# Patient Record
Sex: MALE | Race: WHITE | NOT HISPANIC OR LATINO | Employment: OTHER | URBAN - METROPOLITAN AREA
[De-identification: names, ages, dates, MRNs, and addresses within clinical notes are randomized per-mention and may not be internally consistent; named-entity substitution may affect disease eponyms.]

---

## 2021-03-25 ENCOUNTER — OFFICE VISIT (OUTPATIENT)
Dept: FAMILY MEDICINE CLINIC | Facility: CLINIC | Age: 56
End: 2021-03-25
Payer: COMMERCIAL

## 2021-03-25 VITALS
SYSTOLIC BLOOD PRESSURE: 138 MMHG | BODY MASS INDEX: 27.74 KG/M2 | HEART RATE: 68 BPM | WEIGHT: 183 LBS | HEIGHT: 68 IN | TEMPERATURE: 98.4 F | DIASTOLIC BLOOD PRESSURE: 78 MMHG | OXYGEN SATURATION: 98 %

## 2021-03-25 DIAGNOSIS — Z12.5 SCREENING FOR PROSTATE CANCER: ICD-10-CM

## 2021-03-25 DIAGNOSIS — Z76.89 ENCOUNTER TO ESTABLISH CARE: ICD-10-CM

## 2021-03-25 DIAGNOSIS — Z13.29 SCREENING FOR THYROID DISORDER: ICD-10-CM

## 2021-03-25 DIAGNOSIS — Z13.220 SCREENING FOR LIPID DISORDERS: ICD-10-CM

## 2021-03-25 DIAGNOSIS — Z13.1 SCREENING FOR DIABETES MELLITUS: ICD-10-CM

## 2021-03-25 DIAGNOSIS — Z12.11 SCREENING FOR COLON CANCER: ICD-10-CM

## 2021-03-25 DIAGNOSIS — Z13.6 SCREENING FOR CARDIOVASCULAR CONDITION: ICD-10-CM

## 2021-03-25 DIAGNOSIS — R94.31 ABNORMAL EKG: ICD-10-CM

## 2021-03-25 DIAGNOSIS — Z72.0 TOBACCO USE: ICD-10-CM

## 2021-03-25 DIAGNOSIS — R07.9 CHEST PAIN, UNSPECIFIED TYPE: Primary | ICD-10-CM

## 2021-03-25 DIAGNOSIS — M65.30 TRIGGER FINGER OF RIGHT HAND, UNSPECIFIED FINGER: ICD-10-CM

## 2021-03-25 PROCEDURE — 99203 OFFICE O/P NEW LOW 30 MIN: CPT | Performed by: NURSE PRACTITIONER

## 2021-03-25 RX ORDER — ASPIRIN 81 MG/1
81 TABLET ORAL DAILY
COMMUNITY

## 2021-03-28 PROCEDURE — 93000 ELECTROCARDIOGRAM COMPLETE: CPT | Performed by: NURSE PRACTITIONER

## 2021-03-28 NOTE — PROGRESS NOTES
[facility]    ASSESSMENT AND PLAN     1  Chest pain, unspecified type    Reviewed chest pain  Intermittent  Cannot really qualify or quantify  Does smoke, and have excessive caffeine use  Seems extremely high-strung in the office tonight  States he has always been high energy  Wife present and agrees  Finds it hard to relax  In office EKG completed tonight  Assess further with an echo stress  Likely refer to Cardiology  Strict ER precautions    - POCT ECG  - Echo stress test w contrast if indicated; Future    2  Screening for cardiovascular condition   overdue for screening /preventative lab work  None for several years  Fasting orders placed today  Patient to obtain within the next 1-2 weeks, and schedule a follow-up/physical   Review at that time    - CBC and differential; Future    3  Screening for prostate cancer    - PSA, Total Screen; Future    4  Screening for lipid disorders    - Lipid panel; Future    5  Screening for thyroid disorder    - TSH, 3rd generation with Free T4 reflex; Future    6  Screening for diabetes mellitus    - Comprehensive metabolic panel; Future  - HEMOGLOBIN A1C W/ EAG ESTIMATION; Future    7  Tobacco use   smokes greater than 1 ppd for greater than 40 years  Recommend lung CT screening program   Order placed  Discussed quitting  Patient is not ready at this time    - CT lung screening program; Future    8  Screening for colon cancer    - Ambulatory referral for colonoscopy; Future    9  Encounter to establish care   establishing primary care in our office  10  Abnormal EKG    - Echo stress test w contrast if indicated; Future    11  Trigger finger right hand, unspecified finger  Refer to hand specialist    - Ambulatory referral to hand surgery    SUBJECTIVE       Patient ID: Jackie Thompson is a 54 y o  male      Chief Complaint   Patient presents with    Establish Care    Hypertension       HISTORY OF PRESENT ILLNESS     Patient presents tonight to establish primary care in our office  Was recently at the dentist, and states his blood pressure was 1  systolic  He does not recall the bottom number  He does not check his blood pressure at home  Last time he checked his blood pressure he believes it was 140-150 over 70s  Does admit to occasional / random periods of chest pain  States they are central located  And feel heavy  Resolve within a few minutes  Occur at rest or with exertion  States pain does not radiate  Denies any sweating  Does admit to excessive burping at times  Cannot identify any food or activity triggers tonight  Does admit to drinking excessive amounts of coffee  States he was drinking up to 3 pots per day  He has cut back recently to 1 pot  Does admit to smoking  Averages 1 ppd  Rolls his own cigarettes  Complains also of finger cramping  Right pointer, middle and ring fingers seize and he cannot bend them  He must soak thumb in warm water and manipulate them to release them  The following portions of the patient's history were reviewed and updated as appropriate: allergies, current medications, past family history, past medical history, past social history, past surgical history and problem list     REVIEW OF SYSTEMS  Review of Systems   Constitutional: Negative  HENT: Negative  Respiratory: Negative  Cardiovascular: Positive for chest pain  Negative for palpitations and leg swelling  Gastrointestinal: Negative  Genitourinary: Negative  Musculoskeletal: Positive for arthralgias  Neurological: Negative  Negative for dizziness  Psychiatric/Behavioral: Negative          OBJECTIVE      VITAL SIGNS  /78 (BP Location: Right arm, Patient Position: Sitting)   Pulse 68   Temp 98 4 °F (36 9 °C) (Tympanic)   Ht 5' 7 75" (1 721 m)   Wt 83 kg (183 lb)   SpO2 98%   BMI 28 03 kg/m²     CURRENT MEDICATIONS    Current Outpatient Medications:     aspirin (ECOTRIN LOW STRENGTH) 81 mg EC tablet, Take 81 mg by mouth daily, Disp: , Rfl:     co-enzyme Q-10 30 MG capsule, Take 30 mg by mouth 3 (three) times a day, Disp: , Rfl:     cyanocobalamin (VITAMIN B-12) 500 MCG tablet, Take 500 mcg by mouth daily, Disp: , Rfl:       PHYSICAL EXAMINATION   Physical Exam  Vitals signs and nursing note reviewed  Constitutional:       General: He is not in acute distress  Appearance: Normal appearance  He is well-developed  He is not ill-appearing  HENT:      Head: Normocephalic and atraumatic  Right Ear: Tympanic membrane, ear canal and external ear normal       Left Ear: Tympanic membrane, ear canal and external ear normal    Eyes:      Pupils: Pupils are equal, round, and reactive to light  Neck:      Vascular: No carotid bruit  Cardiovascular:      Rate and Rhythm: Normal rate and regular rhythm  No extrasystoles are present  Heart sounds: Normal heart sounds  Musculoskeletal:      Right lower leg: No edema  Left lower leg: No edema  Lymphadenopathy:      Cervical: No cervical adenopathy  Skin:     General: Skin is warm and dry  Neurological:      Mental Status: He is alert and oriented to person, place, and time     Psychiatric:         Attention and Perception: Attention normal          Mood and Affect: Mood normal          Speech: Speech normal          Behavior: Behavior normal

## 2021-04-07 ENCOUNTER — IMMUNIZATIONS (OUTPATIENT)
Dept: FAMILY MEDICINE CLINIC | Facility: HOSPITAL | Age: 56
End: 2021-04-07
Payer: COMMERCIAL

## 2021-04-07 ENCOUNTER — TRANSCRIBE ORDERS (OUTPATIENT)
Dept: ADMINISTRATIVE | Facility: HOSPITAL | Age: 56
End: 2021-04-07

## 2021-04-07 ENCOUNTER — APPOINTMENT (OUTPATIENT)
Dept: LAB | Facility: HOSPITAL | Age: 56
End: 2021-04-07
Payer: COMMERCIAL

## 2021-04-07 DIAGNOSIS — Z00.8 ENCOUNTER FOR OTHER GENERAL EXAMINATION: ICD-10-CM

## 2021-04-07 DIAGNOSIS — Z00.8 ENCOUNTER FOR OTHER GENERAL EXAMINATION: Primary | ICD-10-CM

## 2021-04-07 DIAGNOSIS — Z23 ENCOUNTER FOR IMMUNIZATION: Primary | ICD-10-CM

## 2021-04-07 LAB
ALBUMIN SERPL BCP-MCNC: 3.8 G/DL (ref 3.5–5)
ALP SERPL-CCNC: 79 U/L (ref 46–116)
ALT SERPL W P-5'-P-CCNC: 32 U/L (ref 12–78)
ANION GAP SERPL CALCULATED.3IONS-SCNC: 7 MMOL/L (ref 4–13)
AST SERPL W P-5'-P-CCNC: 24 U/L (ref 5–45)
BASOPHILS # BLD AUTO: 0.05 THOUSANDS/ΜL (ref 0–0.1)
BASOPHILS NFR BLD AUTO: 1 % (ref 0–1)
BILIRUB SERPL-MCNC: 0.59 MG/DL (ref 0.2–1)
BUN SERPL-MCNC: 14 MG/DL (ref 5–25)
CALCIUM SERPL-MCNC: 8.7 MG/DL (ref 8.3–10.1)
CHLORIDE SERPL-SCNC: 102 MMOL/L (ref 100–108)
CHOLEST SERPL-MCNC: 262 MG/DL (ref 50–200)
CO2 SERPL-SCNC: 29 MMOL/L (ref 21–32)
CREAT SERPL-MCNC: 1.08 MG/DL (ref 0.6–1.3)
EOSINOPHIL # BLD AUTO: 0.26 THOUSAND/ΜL (ref 0–0.61)
EOSINOPHIL NFR BLD AUTO: 3 % (ref 0–6)
ERYTHROCYTE [DISTWIDTH] IN BLOOD BY AUTOMATED COUNT: 13.8 % (ref 11.6–15.1)
EST. AVERAGE GLUCOSE BLD GHB EST-MCNC: 120 MG/DL
GFR SERPL CREATININE-BSD FRML MDRD: 77 ML/MIN/1.73SQ M
GLUCOSE SERPL-MCNC: 103 MG/DL (ref 65–140)
HBA1C MFR BLD: 5.8 %
HCT VFR BLD AUTO: 47.9 % (ref 36.5–49.3)
HDLC SERPL-MCNC: 47 MG/DL
HGB BLD-MCNC: 16.2 G/DL (ref 12–17)
IMM GRANULOCYTES # BLD AUTO: 0.02 THOUSAND/UL (ref 0–0.2)
IMM GRANULOCYTES NFR BLD AUTO: 0 % (ref 0–2)
LDLC SERPL CALC-MCNC: 180 MG/DL (ref 0–100)
LYMPHOCYTES # BLD AUTO: 3.72 THOUSANDS/ΜL (ref 0.6–4.47)
LYMPHOCYTES NFR BLD AUTO: 44 % (ref 14–44)
MCH RBC QN AUTO: 29.5 PG (ref 26.8–34.3)
MCHC RBC AUTO-ENTMCNC: 33.8 G/DL (ref 31.4–37.4)
MCV RBC AUTO: 87 FL (ref 82–98)
MONOCYTES # BLD AUTO: 0.8 THOUSAND/ΜL (ref 0.17–1.22)
MONOCYTES NFR BLD AUTO: 10 % (ref 4–12)
NEUTROPHILS # BLD AUTO: 3.56 THOUSANDS/ΜL (ref 1.85–7.62)
NEUTS SEG NFR BLD AUTO: 42 % (ref 43–75)
NONHDLC SERPL-MCNC: 215 MG/DL
NRBC BLD AUTO-RTO: 0 /100 WBCS
PLATELET # BLD AUTO: 298 THOUSANDS/UL (ref 149–390)
PMV BLD AUTO: 9 FL (ref 8.9–12.7)
POTASSIUM SERPL-SCNC: 3.7 MMOL/L (ref 3.5–5.3)
PROT SERPL-MCNC: 7.4 G/DL (ref 6.4–8.2)
PSA SERPL-MCNC: 0.3 NG/ML (ref 0–4)
RBC # BLD AUTO: 5.49 MILLION/UL (ref 3.88–5.62)
SODIUM SERPL-SCNC: 138 MMOL/L (ref 136–145)
TRIGL SERPL-MCNC: 173 MG/DL
TSH SERPL DL<=0.05 MIU/L-ACNC: 1.71 UIU/ML (ref 0.36–3.74)
WBC # BLD AUTO: 8.41 THOUSAND/UL (ref 4.31–10.16)

## 2021-04-07 PROCEDURE — 80061 LIPID PANEL: CPT

## 2021-04-07 PROCEDURE — 85025 COMPLETE CBC W/AUTO DIFF WBC: CPT

## 2021-04-07 PROCEDURE — G0103 PSA SCREENING: HCPCS

## 2021-04-07 PROCEDURE — 84443 ASSAY THYROID STIM HORMONE: CPT

## 2021-04-07 PROCEDURE — 83036 HEMOGLOBIN GLYCOSYLATED A1C: CPT

## 2021-04-07 PROCEDURE — 0001A SARS-COV-2 / COVID-19 MRNA VACCINE (PFIZER-BIONTECH) 30 MCG: CPT

## 2021-04-07 PROCEDURE — 36415 COLL VENOUS BLD VENIPUNCTURE: CPT

## 2021-04-07 PROCEDURE — 91300 SARS-COV-2 / COVID-19 MRNA VACCINE (PFIZER-BIONTECH) 30 MCG: CPT

## 2021-04-07 PROCEDURE — 80053 COMPREHEN METABOLIC PANEL: CPT

## 2021-04-08 ENCOUNTER — OFFICE VISIT (OUTPATIENT)
Dept: FAMILY MEDICINE CLINIC | Facility: CLINIC | Age: 56
End: 2021-04-08
Payer: COMMERCIAL

## 2021-04-08 DIAGNOSIS — I10 HYPERTENSION, UNSPECIFIED TYPE: ICD-10-CM

## 2021-04-08 DIAGNOSIS — E78.5 HYPERLIPIDEMIA, UNSPECIFIED HYPERLIPIDEMIA TYPE: ICD-10-CM

## 2021-04-08 DIAGNOSIS — Z00.00 ANNUAL PHYSICAL EXAM: Primary | ICD-10-CM

## 2021-04-08 PROCEDURE — 99396 PREV VISIT EST AGE 40-64: CPT | Performed by: NURSE PRACTITIONER

## 2021-04-08 RX ORDER — HYDROCHLOROTHIAZIDE 25 MG/1
25 TABLET ORAL DAILY
Qty: 30 TABLET | Refills: 1 | Status: SHIPPED | OUTPATIENT
Start: 2021-04-08

## 2021-04-08 RX ORDER — ROSUVASTATIN CALCIUM 20 MG/1
20 TABLET, COATED ORAL DAILY
Qty: 30 TABLET | Refills: 1 | Status: SHIPPED | OUTPATIENT
Start: 2021-04-08

## 2021-04-12 VITALS
OXYGEN SATURATION: 98 % | DIASTOLIC BLOOD PRESSURE: 84 MMHG | SYSTOLIC BLOOD PRESSURE: 152 MMHG | TEMPERATURE: 97.6 F | BODY MASS INDEX: 27.46 KG/M2 | RESPIRATION RATE: 16 BRPM | HEART RATE: 72 BPM | HEIGHT: 68 IN | WEIGHT: 181.2 LBS

## 2021-04-12 NOTE — PROGRESS NOTES
ADULT ANNUAL [address] [place] [address]    NAME: Coleen Salmon  AGE: 54 y o  SEX: male  : 1965     DATE: 2021     Assessment and Plan:    comprehensive physical exam today  reviewed recent lab work  Hyperlipidemia:  Total cholesterol: 262, LDL: 180  Reviewed risks  Patient's ASCVD risk:  24 4  Resistant to medication, but agreeable  Will start Crestor 20  Dose/use reviewed  Recheck lipid panel 3 months   blood pressure:  152/84  Recommend antihypertensive  Patient initially resistant, but is agreeable  Recommend checking home blood pressures several times per week  Will start hydrochlorothiazide 25 mg  Dose/ use reviewed  Return 1 month for BP recheck  Return sooner if needed   prevention/screening: vision 2 years ago  Currently working with dentist   Lab work completed as below  Lung CT screening ordered last visit  Patient has not scheduled yet  Colonoscopy referral given last visit as well  Echo stress pending for intermittent chest pain  Patient aware of ER precautions  Return 4 weeks for BP recheck  Certainly sooner as needed  Recommend annual physicals and Q 6 month routine checks once stabilized    Problem List Items Addressed This Visit     None      Visit Diagnoses     Annual physical exam    -  Primary    Hypertension, unspecified type        Relevant Medications    hydrochlorothiazide (HYDRODIURIL) 25 mg tablet    Hyperlipidemia, unspecified hyperlipidemia type        Relevant Medications    rosuvastatin (CRESTOR) 20 MG tablet    Other Relevant Orders    Lipid Panel with Direct LDL reflex    BMI 27 0-27 9,adult              Immunizations and preventive care screenings were discussed with patient today  Appropriate education was printed on patient's after visit summary      Counseling:  Alcohol/drug use: discussed moderation in alcohol intake, the recommendations for healthy alcohol use, and avoidance of illicit drug use  Dental Health: discussed importance of regular tooth brushing, flossing, and dental visits  Injury prevention: discussed safety/seat belts, safety helmets, smoke detectors, carbon dioxide detectors, and smoking near bedding or upholstery  Sexual health: discussed sexually transmitted diseases, partner selection, use of condoms, avoidance of unintended pregnancy, and contraceptive alternatives  · Exercise: the importance of regular exercise/physical activity was discussed  Recommend exercise 3-5 times per week for at least 30 minutes  BMI Counseling: Body mass index is 27 76 kg/m²  The BMI is above normal  Nutrition recommendations include decreasing portion sizes, consuming healthier snacks and limiting drinks that contain sugar  Exercise recommendations include exercising 3-5 times per week  Continue to limit coffee  Continue to decrease cigarette      Depression Screening and Follow-up Plan: Clincally patient does not have depression  No treatment is required  Tobacco Cessation Counseling: Tobacco cessation counseling was provided  The patient is sincerely urged to quit consumption of tobacco  He is ready to quit tobacco  Has decreased significantly  Averaging 10 cigarettes a day  Rolls his own  Continue to decrease  Declines mediation      Return in about 4 weeks (around 5/6/2021) for Recheck  Chief Complaint:     Chief Complaint   Patient presents with    Physical Exam     Annual    Follow-up     Chest Pain x2wk      History of Present Illness:     Adult Annual Physical   Patient here for a comprehensive physical exam  The patient reports problems - as above  Diet and Physical Activity  · Diet/Nutrition: well balanced diet  But does admit to not eating routine meals  · Exercise: no formal exercise        Depression Screening  PHQ-9 Depression Screening    PHQ-9:   Frequency of the following problems over the past two weeks:           General Health  · Sleep: sleeps well, gets 4-6 hours of sleep on average and snores  discussed sleep medicine referral  Re-visit at next follow up  · Hearing: normal - bilateral   · Vision: no vision problems, most recent eye exam >1 year ago and wears glasses  · Dental: currently treating [facility] dentist  Having teeth removed  Will get denture or implants   Health  · Symptoms include: none     Review of Systems:     Review of Systems   Constitutional: Negative  HENT: Negative  Respiratory: Negative  Cardiovascular: Chest pain: no pain since last visit  he feels it is stress related but does have stress echo ordered and scheduled from last visit  Gastrointestinal: Negative  Genitourinary: Negative  Musculoskeletal: Negative  Neurological: Negative  Psychiatric/Behavioral: Negative  Past Medical History:     Past Medical History:   Diagnosis Date    Rheumatic fever 1969      Past Surgical History:     History reviewed  No pertinent surgical history     Family History:     Family History   Problem Relation Age of Onset    Cancer Mother       Social History:     E-Cigarette/Vaping    E-Cigarette Use Never User      E-Cigarette/Vaping Substances    Nicotine No     THC No     CBD No     Flavoring No     Other No     Unknown No      Social History     Socioeconomic History    Marital status: /Civil Union     Spouse name: None    Number of children: None    Years of education: None    Highest education level: None   Occupational History    None   Social Needs    Financial resource strain: None    Food insecurity     Worry: None     Inability: None    Transportation needs     Medical: None     Non-medical: None   Tobacco Use    Smoking status: Current Every Day Smoker     Packs/day: 0 50     Types: Cigarettes    Smokeless tobacco: Never Used   Substance and Sexual Activity    Alcohol use: Yes     Frequency: Monthly or less    Drug use: None    Sexual activity: None   Lifestyle    Physical activity     Days per week: None     Minutes per session: None    Stress: None   Relationships    Social connections     Talks on phone: None     Gets together: None     Attends [employer] service: None     Active member of club or organization: None     Attends meetings of clubs or organizations: None     Relationship status: None    Intimate partner violence     Fear of current or ex partner: None     Emotionally abused: None     Physically abused: None     Forced sexual activity: None   Other Topics Concern    None   Social History Narrative    None      Current Medications:     Current Outpatient Medications   Medication Sig Dispense Refill    aspirin (ECOTRIN LOW STRENGTH) 81 mg EC tablet Take 81 mg by mouth daily      co-enzyme Q-10 30 MG capsule Take 30 mg by mouth 3 (three) times a day      cyanocobalamin (VITAMIN B-12) 500 MCG tablet Take 500 mcg by mouth daily      hydrochlorothiazide (HYDRODIURIL) 25 mg tablet Take 1 tablet (25 mg total) by mouth daily 30 tablet 1    rosuvastatin (CRESTOR) 20 MG tablet Take 1 tablet (20 mg total) by mouth daily 30 tablet 1     No current facility-administered medications for this visit  Allergies:     No Known Allergies   Physical Exam:     /84 (BP Location: Left arm)   Pulse 72   Temp 97 6 °F (36 4 °C) (Tympanic)   Resp 16   Ht 5' 7 75" (1 721 m)   Wt 82 2 kg (181 lb 3 2 oz)   SpO2 98%   BMI 27 76 kg/m²     Physical Exam  Vitals signs and nursing note reviewed  Constitutional:       General: He is not in acute distress  Appearance: Normal appearance  He is well-developed  He is not ill-appearing  HENT:      Head: Normocephalic and atraumatic  Right Ear: Tympanic membrane, ear canal and external ear normal       Left Ear: Tympanic membrane, ear canal and external ear normal       Nose: Nose normal       Mouth/Throat:      Dentition: Abnormal dentition  Dental caries present  Pharynx: Oropharynx is clear        Comments: Currently treating with a dentist  Will be getting full dentures  Eyes:      Extraocular Movements: Extraocular movements intact  Conjunctiva/sclera: Conjunctivae normal       Pupils: Pupils are equal, round, and reactive to light  Neck:      Thyroid: No thyromegaly  Vascular: No carotid bruit  Cardiovascular:      Rate and Rhythm: Normal rate and regular rhythm  Heart sounds: Normal heart sounds  Pulmonary:      Effort: Pulmonary effort is normal  No respiratory distress  Breath sounds: Normal breath sounds and air entry  Abdominal:      General: Abdomen is flat  Bowel sounds are normal       Palpations: Abdomen is soft  Tenderness: There is no abdominal tenderness  There is no right CVA tenderness, left CVA tenderness, guarding or rebound  Musculoskeletal:      Right lower leg: No edema  Left lower leg: No edema  Lymphadenopathy:      Cervical: No cervical adenopathy  Skin:     General: Skin is warm and dry  Neurological:      Mental Status: He is alert and oriented to person, place, and time  Cranial Nerves: No cranial nerve deficit  Sensory: No sensory deficit  Motor: No weakness or tremor  Coordination: Coordination normal       Gait: Gait normal    Psychiatric:         Attention and Perception: Attention normal          Mood and Affect: Mood normal          Speech: Speech normal          Behavior: Behavior normal          Thought Content: Thought content normal       Comments: High energy  Does admit to difficulty relaxing            CARLA Lamar  [address] [place] [address]

## 2021-04-12 NOTE — PATIENT INSTRUCTIONS

## 2021-04-21 VITALS
DIASTOLIC BLOOD PRESSURE: 103 MMHG | HEART RATE: 76 BPM | SYSTOLIC BLOOD PRESSURE: 185 MMHG | HEIGHT: 68 IN | WEIGHT: 179 LBS | BODY MASS INDEX: 27.13 KG/M2

## 2021-04-21 DIAGNOSIS — M79.641 HAND PAIN, RIGHT: ICD-10-CM

## 2021-04-21 DIAGNOSIS — M65.341 TRIGGER RING FINGER OF RIGHT HAND: Primary | ICD-10-CM

## 2021-04-21 PROCEDURE — 99244 OFF/OP CNSLTJ NEW/EST MOD 40: CPT | Performed by: ORTHOPAEDIC SURGERY

## 2021-04-21 PROCEDURE — 20550 NJX 1 TENDON SHEATH/LIGAMENT: CPT | Performed by: ORTHOPAEDIC SURGERY

## 2021-04-21 RX ORDER — LIDOCAINE HYDROCHLORIDE 10 MG/ML
0.5 INJECTION, SOLUTION INFILTRATION; PERINEURAL
Status: COMPLETED | OUTPATIENT
Start: 2021-04-21 | End: 2021-04-21

## 2021-04-21 RX ORDER — BETAMETHASONE SODIUM PHOSPHATE AND BETAMETHASONE ACETATE 3; 3 MG/ML; MG/ML
3 INJECTION, SUSPENSION INTRA-ARTICULAR; INTRALESIONAL; INTRAMUSCULAR; SOFT TISSUE
Status: COMPLETED | OUTPATIENT
Start: 2021-04-21 | End: 2021-04-21

## 2021-04-21 RX ADMIN — LIDOCAINE HYDROCHLORIDE 0.5 ML: 10 INJECTION, SOLUTION INFILTRATION; PERINEURAL at 09:07

## 2021-04-21 RX ADMIN — BETAMETHASONE SODIUM PHOSPHATE AND BETAMETHASONE ACETATE 3 MG: 3; 3 INJECTION, SUSPENSION INTRA-ARTICULAR; INTRALESIONAL; INTRAMUSCULAR; SOFT TISSUE at 09:07

## 2021-04-21 NOTE — PROGRESS NOTES
Chief Complaint     Right hand and ring finger pain     History of Present Illness     Esequiel Woo is a 54 y o  an ambidextrous male  who presents the office today for evaluation of his right ring finger  I am seeing him in consultation at the request of Cheryl Shook  Patient states he has had right hand ring finger pain for about 2 and half months  He does not injury that occurred about 4 months ago when steel fell on top of his hand  He states he believes he broke the hand at this time and immobilized in a splint  In the last 2 and half months is ring finger has been locking which is very painful to him  He does not know any new injuries  He uses his hand for work and does do a lot a lifting for his job  Does not report any numbness and tingling today  He states he has had a fracture to the right hand before many years ago  Patient is currently working as a waste equipment   Past Medical History:   Diagnosis Date    Rheumatic fever 1969       History reviewed  No pertinent surgical history  No Known Allergies    Current Outpatient Medications on File Prior to Visit   Medication Sig Dispense Refill    co-enzyme Q-10 30 MG capsule Take 30 mg by mouth 3 (three) times a day      aspirin (ECOTRIN LOW STRENGTH) 81 mg EC tablet Take 81 mg by mouth daily      cyanocobalamin (VITAMIN B-12) 500 MCG tablet Take 500 mcg by mouth daily      hydrochlorothiazide (HYDRODIURIL) 25 mg tablet Take 1 tablet (25 mg total) by mouth daily (Patient not taking: Reported on 4/21/2021) 30 tablet 1    rosuvastatin (CRESTOR) 20 MG tablet Take 1 tablet (20 mg total) by mouth daily (Patient not taking: Reported on 4/21/2021) 30 tablet 1     No current facility-administered medications on file prior to visit          Social History     Tobacco Use    Smoking status: Current Every Day Smoker     Packs/day: 0 50     Types: Cigarettes    Smokeless tobacco: Never Used   Substance Use Topics    Alcohol use: Yes     Frequency: Monthly or less    Drug use: Not on file       Family History   Problem Relation Age of Onset    Cancer Mother        Review of Systems     As stated in the HPI  All other systems were reviewed and are negative  Physical Exam     BP (!) 185/103   Pulse 76   Ht 5' 7 75" (1 721 m)   Wt 81 2 kg (179 lb)   BMI 27 42 kg/m²     GENERAL: This is a well-developed, well-nourished, age-appropriate patient in no acute distress  The patient is alert and oriented x3  Pleasant and cooperative  Eyes: Anicteric sclerae  Extraocular movements appear intact  HENT: Nares are patent with no drainage  Lungs: There is equal chest rise on inspection  Breathing is non-labored with no audible wheezing  Cardiovascular: No cyanosis  No upper extremity lymphadema  Skin: Skin is warm to touch  No obvious skin lesions or rashes other than described below  Neurologic: No ataxia  Psychiatric: Mood and affect are appropriate  Right ring finger   Skin intact   No erythema or ecchymosis noted   Swelling noted at the A1 pulley   No flexion contracture noted   [facility] is 1  Extensor tendon intact with no subluxation   Tender at a1 pulley,  No active triggering noted  Sensation intact to the radial and ulnar aspect of the digit   Brisk capillary refill noted    Data Review     Labs:   A1c obtained on 04/07/2021 is 5 8  Electrodiagnostic Testing:   N/a    Imaging:  None today    Assessment and Plan      Diagnoses and all orders for this visit:    Hand pain, right  -     XR hand 3+ vw right; Future    Trigger finger of right hand, unspecified finger  -     Ambulatory referral to Hand Surgery            54-year-old male with right ring trigger finger  Patient and I discussed the etiology of this condition today in the office  We discussed non operative treatment of corticosteroid injection  Patient and I discussed the risks and benefits of injection   Risks of the injection including pain, infection, tendon rupture, progression of arthritis, fat atrophy, depigmentation, and worsening of symptoms were all discussed with the patient  There was good understanding by the patient and they wish to proceed  Injection was tolerated well  He may continue to use the hand as tolerated but should try to take it easy today at his job  I will see him back in 1 month if he continues to symptoms, otherwise I will see him back on an as needed basis  I discussed the natural course and treatment options of trigger finger with the patient  We discussed that the etiology is thickening of the tendon sheath surrounding the flexor tendons in the distal palm and that common symptoms are pain, catching, clicking, popping, and locking of the digit  We also discussed that there are surgical and nonsurgical means to treat this  Activity modification can be somewhat helpful, though corticosteroid injections are often the first-line treatment for this condition  The published efficacy of the 1st injection for trigger finger is about 45% at achieving full remission and that we may pursue up to 3 injections per the patient's desire if symptomatology returns  At any point, the patient may choose to have surgical intervention which would involve release of the A1 pulley  We discussed the technical aspects, risks, and benefits of the procedure, perioperative care, and expected recovery from this surgery  Follow Up: PRN/4 weeks     To Do Next Visit:     PROCEDURES PERFORMED:  Hand/upper extremity injection: R ring A1  Universal Protocol:  Consent: Verbal consent obtained    Consent given by: patient  Timeout called at: 4/21/2021 9:05 AM   Procedure Details  Condition:trigger finger Location: ring finger - R ring A1   Needle size: 25 G  Ultrasound guidance: no  Medications administered: 3 mg betamethasone acetate-betamethasone sodium phosphate 6 (3-3) mg/mL; 0 5 mL lidocaine 1 %    Patient tolerance: patient tolerated the procedure well with no immediate complications  Dressing:  Sterile dressing applied                Scribe Attestation    I,:  Lydia Carpenter MA am acting as a scribe while in the presence of the attending physician :       I,:  Benita Pastrana MD personally performed the services described in this documentation    as scribed in my presence :

## 2021-04-21 NOTE — LETTER
April 21, 2021     Deidre Preston [address]    Patient: Angela Lujan   YOB: 1965   Date of Visit: 4/21/2021       Dear Dr Cam Cervantes: Thank you for referring Alfonso Escudero to me for evaluation  Below are my notes for this consultation  If you have questions, please do not hesitate to call me  I look forward to following your patient along with you  Sincerely,        Lois Foy MD        CC: No Recipients  Lois Foy MD  4/21/2021 10:59 AM  Sign when Signing Visit  Chief Complaint     Right hand and ring finger pain     History of Present Illness     Angela Lujan is a 54 y o  an ambidextrous male  who presents the office today for evaluation of his right ring finger  I am seeing him in consultation at the request of Cheryl Shore  Patient states he has had right hand ring finger pain for about 2 and half months  He does not injury that occurred about 4 months ago when steel fell on top of his hand  He states he believes he broke the hand at this time and immobilized in a splint  In the last 2 and half months is ring finger has been locking which is very painful to him  He does not know any new injuries  He uses his hand for work and does do a lot a lifting for his job  Does not report any numbness and tingling today  He states he has had a fracture to the right hand before many years ago  Patient is currently working as a waste equipment   Past Medical History:   Diagnosis Date    Rheumatic fever 1969       History reviewed  No pertinent surgical history      No Known Allergies    Current Outpatient Medications on File Prior to Visit   Medication Sig Dispense Refill    co-enzyme Q-10 30 MG capsule Take 30 mg by mouth 3 (three) times a day      aspirin (ECOTRIN LOW STRENGTH) 81 mg EC tablet Take 81 mg by mouth daily      cyanocobalamin (VITAMIN B-12) 500 MCG tablet Take 500 mcg by mouth daily      hydrochlorothiazide (HYDRODIURIL) 25 mg tablet Take 1 tablet (25 mg total) by mouth daily (Patient not taking: Reported on 4/21/2021) 30 tablet 1    rosuvastatin (CRESTOR) 20 MG tablet Take 1 tablet (20 mg total) by mouth daily (Patient not taking: Reported on 4/21/2021) 30 tablet 1     No current facility-administered medications on file prior to visit  Social History     Tobacco Use    Smoking status: Current Every Day Smoker     Packs/day: 0 50     Types: Cigarettes    Smokeless tobacco: Never Used   Substance Use Topics    Alcohol use: Yes     Frequency: Monthly or less    Drug use: Not on file       Family History   Problem Relation Age of Onset    Cancer Mother        Review of Systems     As stated in the HPI  All other systems were reviewed and are negative  Physical Exam     BP (!) 185/103   Pulse 76   Ht 5' 7 75" (1 721 m)   Wt 81 2 kg (179 lb)   BMI 27 42 kg/m²     GENERAL: This is a well-developed, well-nourished, age-appropriate patient in no acute distress  The patient is alert and oriented x3  Pleasant and cooperative  Eyes: Anicteric sclerae  Extraocular movements appear intact  HENT: Nares are patent with no drainage  Lungs: There is equal chest rise on inspection  Breathing is non-labored with no audible wheezing  Cardiovascular: No cyanosis  No upper extremity lymphadema  Skin: Skin is warm to touch  No obvious skin lesions or rashes other than described below  Neurologic: No ataxia  Psychiatric: Mood and affect are appropriate  Right ring finger   Skin intact   No erythema or ecchymosis noted   Swelling noted at the A1 pulley   No flexion contracture noted   [facility] is 1  Extensor tendon intact with no subluxation   Tender at a1 pulley,  No active triggering noted  Sensation intact to the radial and ulnar aspect of the digit   Brisk capillary refill noted    Data Review     Labs:   A1c obtained on 04/07/2021 is 5 8      Electrodiagnostic Testing: N/a    Imaging:  None today    Assessment and Plan      Diagnoses and all orders for this visit:    Hand pain, right  -     XR hand 3+ vw right; Future    Trigger finger of right hand, unspecified finger  -     Ambulatory referral to Hand Surgery            70-year-old male with right ring trigger finger  Patient and I discussed the etiology of this condition today in the office  We discussed non operative treatment of corticosteroid injection  Patient and I discussed the risks and benefits of injection  Risks of the injection including pain, infection, tendon rupture, progression of arthritis, fat atrophy, depigmentation, and worsening of symptoms were all discussed with the patient  There was good understanding by the patient and they wish to proceed  Injection was tolerated well  He may continue to use the hand as tolerated but should try to take it easy today at his job  I will see him back in 1 month if he continues to symptoms, otherwise I will see him back on an as needed basis  I discussed the natural course and treatment options of trigger finger with the patient  We discussed that the etiology is thickening of the tendon sheath surrounding the flexor tendons in the distal palm and that common symptoms are pain, catching, clicking, popping, and locking of the digit  We also discussed that there are surgical and nonsurgical means to treat this  Activity modification can be somewhat helpful, though corticosteroid injections are often the first-line treatment for this condition  The published efficacy of the 1st injection for trigger finger is about 45% at achieving full remission and that we may pursue up to 3 injections per the patient's desire if symptomatology returns  At any point, the patient may choose to have surgical intervention which would involve release of the A1 pulley    We discussed the technical aspects, risks, and benefits of the procedure, perioperative care, and expected recovery from this surgery  Follow Up: PRN/4 weeks     To Do Next Visit:     PROCEDURES PERFORMED:  Hand/upper extremity injection: R ring A1  Universal Protocol:  Consent: Verbal consent obtained    Consent given by: patient  Timeout called at: 4/21/2021 9:05 AM   Procedure Details  Condition:trigger finger Location: ring finger - R ring A1   Needle size: 25 G  Ultrasound guidance: no  Medications administered: 3 mg betamethasone acetate-betamethasone sodium phosphate 6 (3-3) mg/mL; 0 5 mL lidocaine 1 %    Patient tolerance: patient tolerated the procedure well with no immediate complications  Dressing:  Sterile dressing applied                Scribe Attestation    I,:  Lou Maldonado MA am acting as a scribe while in the presence of the attending physician :       I,:  Berto Hunter MD personally performed the services described in this documentation    as scribed in my presence :

## 2021-04-23 ENCOUNTER — TELEPHONE (OUTPATIENT)
Dept: GASTROENTEROLOGY | Facility: CLINIC | Age: 56
End: 2021-04-23

## 2021-04-23 DIAGNOSIS — Z12.11 SPECIAL SCREENING FOR MALIGNANT NEOPLASMS, COLON: Primary | ICD-10-CM

## 2021-04-23 NOTE — TELEPHONE ENCOUNTER
Passed oa  Colonoscopy scheduled 06/17/21 @[place] with Dr Alvis Closs Suprep instructions given to pt verbally/mailed  04/23/21  Screened by: Maria Isabel Espinosa    Referring Provider Ashanti Helm    Pre- Screening:   BMI  27 42    Has patient been referred for a routine screening Colonoscopy? yes  Is the patient between 39-70 years old? yes      Previous Colonoscopy no   If yes:    Date:     Facility:     Reason:       SCHEDULING STAFF: If the patient is between 45yrs-49yrs, please advise patient to confirm benefits/coverage with their insurance company for a routine screening colonoscopy, some insurance carriers will only cover at [address] or older  If the patient is over 66years old, please schedule an office visit  Does the patient want to see a Gastroenterologist prior to their procedure OR are they having any GI symptoms? no    Has the patient been hospitalized or had abdominal surgery in the past 6 months? no    Does the patient use supplemental oxygen? no    Does the patient take Coumadin, Lovenox, Plavix, Elliquis, Xarelto, or other blood thinning medication? no    Has the patient had a stroke, cardiac event, or stent placed in the past year? no    SCHEDULING STAFF: If patient answers NO to above questions, then schedule procedure  If patient answers YES to above questions, then schedule office appointment  If patient is between 45yrs - 49yrs, please advise patient that we will have to confirm benefits & coverage with their insurance company for a routine screening colonoscopy

## 2021-04-26 RX ORDER — SODIUM, POTASSIUM,MAG SULFATES 17.5-3.13G
SOLUTION, RECONSTITUTED, ORAL ORAL
Qty: 2 BOTTLE | Refills: 0 | Status: SHIPPED | OUTPATIENT
Start: 2021-04-26 | End: 2021-06-17 | Stop reason: HOSPADM

## 2021-05-06 ENCOUNTER — IMMUNIZATIONS (OUTPATIENT)
Dept: FAMILY MEDICINE CLINIC | Facility: HOSPITAL | Age: 56
End: 2021-05-06

## 2021-05-06 DIAGNOSIS — Z23 ENCOUNTER FOR IMMUNIZATION: Primary | ICD-10-CM

## 2021-05-06 PROCEDURE — 91300 SARS-COV-2 / COVID-19 MRNA VACCINE (PFIZER-BIONTECH) 30 MCG: CPT

## 2021-05-06 PROCEDURE — 0002A SARS-COV-2 / COVID-19 MRNA VACCINE (PFIZER-BIONTECH) 30 MCG: CPT

## 2021-05-07 RX ORDER — RIBOFLAVIN (VITAMIN B2) 100 MG
100 TABLET ORAL DAILY
COMMUNITY

## 2021-05-07 RX ORDER — THIAMINE HCL 50 MG
50 TABLET ORAL DAILY
COMMUNITY

## 2021-05-25 ENCOUNTER — TRANSCRIBE ORDERS (OUTPATIENT)
Dept: ADMINISTRATIVE | Facility: HOSPITAL | Age: 56
End: 2021-05-25

## 2021-05-27 ENCOUNTER — TELEPHONE (OUTPATIENT)
Dept: GASTROENTEROLOGY | Facility: MEDICAL CENTER | Age: 56
End: 2021-05-27

## 2021-05-27 NOTE — TELEPHONE ENCOUNTER
Patient returned my call and stated that "his blood work was wrong because no one told him to fast and that he is not having this procedure in any hospital" It was explained to the patient that unfortunately it is out of my hands and that it is the anesthesiologists decision   The patient stated " he's going to have blood work done again and he wants it reviewed because he doesn't even want to have this done, he's only doing it because of his wife"

## 2021-05-27 NOTE — TELEPHONE ENCOUNTER
Left detailed message for patient that anesthesia feels he is better suited to have his colonoscopy in a hospital setting  Patient was advised that there is available time at [facility] on the same day 6/17/21 with a different provider   Patient was instructed to call the office to inform us what he would like to do

## 2021-06-07 ENCOUNTER — TELEPHONE (OUTPATIENT)
Dept: GASTROENTEROLOGY | Facility: CLINIC | Age: 56
End: 2021-06-07

## 2021-06-07 NOTE — TELEPHONE ENCOUNTER
Patients GI provider:  Dr Char Conn    Number to return call: [phone]    Reason for call: Pt called stating He has a Colonoscopy on 6/17 but the Anesthesiologist told him that the Procedure will have to be done at the Hospital because of lab results   Pt called to reschedule, Pt would like the procedure to be schedule the same day 6/17/21    Scheduled procedure/appointment date if applicable: 8/92/59

## 2021-06-08 NOTE — TELEPHONE ENCOUNTER
Colon rescheduled to [facility] with Dr Helio Yuan on 6/17/21  Patient will call his pharmacy to see if the Ashia Fox is still ready for   If not, he will call us back to have prep resent in

## 2021-06-09 ENCOUNTER — TELEPHONE (OUTPATIENT)
Dept: PREADMISSION TESTING | Facility: HOSPITAL | Age: 56
End: 2021-06-09

## 2021-06-16 ENCOUNTER — TELEPHONE (OUTPATIENT)
Dept: GASTROENTEROLOGY | Facility: HOSPITAL | Age: 56
End: 2021-06-16

## 2021-06-17 ENCOUNTER — ANESTHESIA EVENT (OUTPATIENT)
Dept: ANESTHESIOLOGY | Facility: HOSPITAL | Age: 56
End: 2021-06-17

## 2021-06-17 ENCOUNTER — ANESTHESIA (OUTPATIENT)
Dept: ANESTHESIOLOGY | Facility: HOSPITAL | Age: 56
End: 2021-06-17

## 2021-06-17 ENCOUNTER — ANESTHESIA (OUTPATIENT)
Dept: GASTROENTEROLOGY | Facility: HOSPITAL | Age: 56
End: 2021-06-17

## 2021-06-17 ENCOUNTER — TELEPHONE (OUTPATIENT)
Dept: GASTROENTEROLOGY | Facility: MEDICAL CENTER | Age: 56
End: 2021-06-17

## 2021-06-17 ENCOUNTER — PREP FOR PROCEDURE (OUTPATIENT)
Dept: GASTROENTEROLOGY | Facility: MEDICAL CENTER | Age: 56
End: 2021-06-17

## 2021-06-17 ENCOUNTER — HOSPITAL ENCOUNTER (OUTPATIENT)
Dept: GASTROENTEROLOGY | Facility: HOSPITAL | Age: 56
Setting detail: OUTPATIENT SURGERY
Discharge: HOME | End: 2021-06-17
Attending: INTERNAL MEDICINE | Admitting: INTERNAL MEDICINE
Payer: COMMERCIAL

## 2021-06-17 ENCOUNTER — ANESTHESIA EVENT (OUTPATIENT)
Dept: GASTROENTEROLOGY | Facility: HOSPITAL | Age: 56
End: 2021-06-17

## 2021-06-17 VITALS
HEIGHT: 68 IN | SYSTOLIC BLOOD PRESSURE: 182 MMHG | HEART RATE: 55 BPM | WEIGHT: 171 LBS | TEMPERATURE: 97.9 F | RESPIRATION RATE: 18 BRPM | DIASTOLIC BLOOD PRESSURE: 97 MMHG | BODY MASS INDEX: 25.91 KG/M2 | OXYGEN SATURATION: 95 %

## 2021-06-17 DIAGNOSIS — Z12.11 SPECIAL SCREENING FOR MALIGNANT NEOPLASMS, COLON: Primary | ICD-10-CM

## 2021-06-17 DIAGNOSIS — Z12.11 SPECIAL SCREENING FOR MALIGNANT NEOPLASMS, COLON: ICD-10-CM

## 2021-06-17 PROBLEM — E78.5 HYPERLIPIDEMIA: Status: ACTIVE | Noted: 2021-06-17

## 2021-06-17 PROBLEM — I10 HTN (HYPERTENSION): Status: ACTIVE | Noted: 2021-06-17

## 2021-06-17 PROCEDURE — G0121 COLON CA SCRN NOT HI RSK IND: HCPCS | Performed by: INTERNAL MEDICINE

## 2021-06-17 RX ORDER — LIDOCAINE HYDROCHLORIDE 10 MG/ML
INJECTION, SOLUTION EPIDURAL; INFILTRATION; INTRACAUDAL; PERINEURAL AS NEEDED
Status: DISCONTINUED | OUTPATIENT
Start: 2021-06-17 | End: 2021-06-17

## 2021-06-17 RX ORDER — PROPOFOL 10 MG/ML
INJECTION, EMULSION INTRAVENOUS AS NEEDED
Status: DISCONTINUED | OUTPATIENT
Start: 2021-06-17 | End: 2021-06-17

## 2021-06-17 RX ORDER — SODIUM CHLORIDE 9 MG/ML
INJECTION, SOLUTION INTRAVENOUS CONTINUOUS PRN
Status: DISCONTINUED | OUTPATIENT
Start: 2021-06-17 | End: 2021-06-17

## 2021-06-17 RX ORDER — SODIUM CHLORIDE 9 MG/ML
50 INJECTION, SOLUTION INTRAVENOUS CONTINUOUS
Status: DISCONTINUED | OUTPATIENT
Start: 2021-06-17 | End: 2021-06-21 | Stop reason: HOSPADM

## 2021-06-17 RX ADMIN — SODIUM CHLORIDE: 0.9 INJECTION, SOLUTION INTRAVENOUS at 14:03

## 2021-06-17 RX ADMIN — PROPOFOL 50 MG: 10 INJECTION, EMULSION INTRAVENOUS at 14:32

## 2021-06-17 RX ADMIN — PROPOFOL 150 MG: 10 INJECTION, EMULSION INTRAVENOUS at 14:28

## 2021-06-17 RX ADMIN — SODIUM CHLORIDE 50 ML/HR: 0.9 INJECTION, SOLUTION INTRAVENOUS at 11:40

## 2021-06-17 RX ADMIN — LIDOCAINE HYDROCHLORIDE 50 MG: 10 INJECTION, SOLUTION EPIDURAL; INFILTRATION; INTRACAUDAL; PERINEURAL at 14:29

## 2021-06-17 NOTE — ANESTHESIA PREPROCEDURE EVALUATION
Procedure:  PRE-OP ONLY    Relevant Problems   CARDIO   (+) HTN (hypertension)   (+) Hyperlipidemia             Anesthesia Plan  ASA Score- 2     Anesthesia Type- IV sedation with anesthesia with ASA Monitors  Additional Monitors:   Airway Plan:           Plan Factors-Exercise tolerance (METS): >4 METS  Chart reviewed  Existing labs reviewed  Induction-     Postoperative Plan-     Informed Consent- Anesthetic plan and risks discussed with patient  I personally reviewed this patient with the CRNA  Discussed and agreed on the Anesthesia Plan with the CRNA               Lab Results   Component Value Date    HGBA1C 5 8 (H) 04/07/2021       Lab Results   Component Value Date    K 3 7 04/07/2021     04/07/2021    CO2 29 04/07/2021    BUN 14 04/07/2021    CREATININE 1 08 04/07/2021    CALCIUM 8 7 04/07/2021    AST 24 04/07/2021    ALT 32 04/07/2021    ALKPHOS 79 04/07/2021    EGFR 77 04/07/2021       Lab Results   Component Value Date    WBC 8 41 04/07/2021    HGB 16 2 04/07/2021    HCT 47 9 04/07/2021    MCV 87 04/07/2021     04/07/2021      Normal sinus rhythm   Possible left atrial enlargement   Incomplete right bundle branch block

## 2021-06-17 NOTE — NURSING NOTE
Pt returned to [address] [place] [address], wanting to leave right away  Dr Price Mcfarlane in to instruct pt, wife at bedside  Written and verbal instructions given  Pt removed own IV  Pt going to eat on the way home  Pt left unit via ambulation with wife

## 2021-06-17 NOTE — TELEPHONE ENCOUNTER
Hi,    Can we schedule the patient for a repeat colonoscopy? He needs a 2 day prep given the poor prep       Thank you

## 2021-06-17 NOTE — DISCHARGE INSTRUCTIONS
Colonoscopy   WHAT YOU NEED TO KNOW:   A colonoscopy is a procedure to examine the inside of your colon (intestine) with a scope  Polyps or tissue growths may have been removed during your colonoscopy  It is normal to feel bloated and to have some abdominal discomfort  You should be passing gas  If you have hemorrhoids or you had polyps removed, you may have a small amount of bleeding  DISCHARGE INSTRUCTIONS:   Call your doctor if:   · You have a large amount of bright red blood in your bowel movements  · Your abdomen is hard and firm and you have severe pain  · You have sudden trouble breathing  · You develop a rash or hives  · You have a fever within 24 hours of your procedure  · You have not had a bowel movement for 3 days after your procedure  · You have questions or concerns about your condition or care  After your colonoscopy:   · Do not lift, strain, or run  for 3 days  · Rest as much as possible  You have been given medicine to relax you  Do not  drive or make important decisions for at least 24 hours  Return to your normal activity as directed  · Relieve gas and discomfort from bloating  by lying on your left side with a heating pad on your abdomen  You may need to take short walks to help the gas move out  Eat small meals until bloating is relieved  If you had polyps removed: For 7 days after your procedure:  · Do not  take aspirin  · Do not  go on long car rides  Help prevent constipation:   · Eat a variety of healthy foods  Healthy foods include fruit, vegetables, whole-grain breads, low-fat dairy products, beans, lean meat, and fish  Ask if you need to be on a special diet  Your healthcare provider may recommend that you eat high-fiber foods such as cooked beans  Fiber helps you have regular bowel movements  · Drink liquids as directed  Adults should drink between 9 and 13 eight-ounce cups of liquid every day  Ask what amount is best for you   For most people, good liquids to drink are water, juice, and milk  · Exercise as directed  Talk to your healthcare provider about the best exercise plan for you  Exercise can help prevent constipation, decrease your blood pressure and improve your health  Follow up with your healthcare provider as directed:  Write down your questions so you remember to ask them during your visits  © Copyright [address] Information is for End User's use only and may not be sold, redistributed or otherwise used for commercial purposes  All illustrations and images included in CareNotes® are the copyrighted property of A D A M , Inc  or [address] [place], [place] [address]carmencita Juarez   The above information is an  only  It is not intended as medical advice for individual conditions or treatments  Talk to your doctor, nurse or pharmacist before following any medical regimen to see if it is safe and effective for you

## 2021-06-17 NOTE — ANESTHESIA PREPROCEDURE EVALUATION
Procedure:  COLONOSCOPY    Relevant Problems   CARDIO   (+) HTN (hypertension)   (+) Hyperlipidemia        Physical Exam    Airway    Mallampati score: II  TM Distance: >3 FB  Neck ROM: full     Dental   Comment: Has few loose teeth but says they are not very loose ,     Cardiovascular  Cardiovascular exam normal    Pulmonary  Pulmonary exam normal     Other Findings        Anesthesia Plan  ASA Score- 2     Anesthesia Type- IV sedation with anesthesia with ASA Monitors  Additional Monitors:   Airway Plan:           Plan Factors-Exercise tolerance (METS): >4 METS  Chart reviewed  Existing labs reviewed  Patient is a current smoker  Patient instructed to abstain from smoking on day of procedure  Patient smoked on day of surgery  Induction-     Postoperative Plan-     Informed Consent- Anesthetic plan and risks discussed with patient and spouse  I personally reviewed this patient with the CRNA  Discussed and agreed on the Anesthesia Plan with the CRNA               Lab Results   Component Value Date    HGBA1C 5 8 (H) 04/07/2021       Lab Results   Component Value Date    K 3 7 04/07/2021     04/07/2021    CO2 29 04/07/2021    BUN 14 04/07/2021    CREATININE 1 08 04/07/2021    CALCIUM 8 7 04/07/2021    AST 24 04/07/2021    ALT 32 04/07/2021    ALKPHOS 79 04/07/2021    EGFR 77 04/07/2021       Lab Results   Component Value Date    WBC 8 41 04/07/2021    HGB 16 2 04/07/2021    HCT 47 9 04/07/2021    MCV 87 04/07/2021     04/07/2021      Normal sinus rhythm   Possible left atrial enlargement   Incomplete right bundle branch block

## 2021-06-17 NOTE — H&P
History and Physical -  [facility]  Savanah Romero 54 y o  male MRN: [id number]                  HPI: Savanah Romero is a 54y o  year old male who presents for CRC screening      REVIEW OF SYSTEMS: Per the HPI, and otherwise unremarkable  Historical Information   Past Medical History:   Diagnosis Date    Rheumatic fever 1969     Past Surgical History:   Procedure Laterality Date    KNEE ARTHROSCOPY Left      Social History   Social History     Substance and Sexual Activity   Alcohol Use Yes     Social History     Substance and Sexual Activity   Drug Use Not on file     Social History     Tobacco Use   Smoking Status Current Every Day Smoker    Packs/day: 0 50    Types: Cigarettes   Smokeless Tobacco Never Used     Family History   Problem Relation Age of Onset    Cancer Mother        Meds/Allergies     (Not in a hospital admission)      No Known Allergies    Objective     Blood pressure (!) 184/93, pulse 60, temperature (!) 97 1 °F (36 2 °C), temperature source Temporal, resp  rate 20, height 5' 7 75" (1 721 m), weight 77 6 kg (171 lb), SpO2 98 %  PHYSICAL EXAMINATION:    General Appearance:   Alert, cooperative, no distress   HEENT:  Normocephalic, atraumatic, anicteric  Neck supple, symmetrical, trachea midline  Lungs:   Equal chest rise and unlabored breathing, normal effort, no coughing  Cardiovascular:   No visualized JVD  Abdomen:   No abdominal distension  Skin:   No jaundice, rashes, or lesions  Musculoskeletal:   Normal range of motion visualized  Psych:  Normal affect and normal insight  Neuro:  Alert and appropriate  ASSESSMENT/PLAN:  This is a 54y o  year old male here for colonoscopy, and he is stable and optimized for his procedure

## 2021-06-21 ENCOUNTER — TELEPHONE (OUTPATIENT)
Dept: GASTROENTEROLOGY | Facility: MEDICAL CENTER | Age: 56
End: 2021-06-21

## 2021-06-21 NOTE — TELEPHONE ENCOUNTER
PT was busy at the moment, unable to schedule at this time  Gave back line number to call and schedule when he can

## 2021-06-21 NOTE — TELEPHONE ENCOUNTER
Pt scheduled for repeat colon and has questions regarding the preps  He does not like taking any medication for anything at all, and has concerns about taking a prep again and it's effectiveness/side effects   Wants to discuss options,  If someone could call him to discuss please, thanks so much

## 2021-06-22 NOTE — TELEPHONE ENCOUNTER
Pt called stating he does not want to take suprep for colonoscopy in July  "Last time I got constipated and my blood pressure ibrahima rocketed"  Pt prefers OTC miralax/dulcolax prep and stated he does not require a script  Bowel prep instructions emailed to pt and reviewed over phone

## 2021-07-21 ENCOUNTER — ANESTHESIA EVENT (OUTPATIENT)
Dept: ANESTHESIOLOGY | Facility: HOSPITAL | Age: 56
End: 2021-07-21

## 2021-07-21 ENCOUNTER — TELEPHONE (OUTPATIENT)
Dept: GASTROENTEROLOGY | Facility: HOSPITAL | Age: 56
End: 2021-07-21

## 2021-07-21 ENCOUNTER — ANESTHESIA (OUTPATIENT)
Dept: ANESTHESIOLOGY | Facility: HOSPITAL | Age: 56
End: 2021-07-21

## 2021-07-21 PROBLEM — F17.200 SMOKING: Status: ACTIVE | Noted: 2021-07-21

## 2021-07-21 PROBLEM — IMO0001 SMOKING: Status: ACTIVE | Noted: 2021-07-21

## 2021-07-21 RX ORDER — SODIUM CHLORIDE 9 MG/ML
125 INJECTION, SOLUTION INTRAVENOUS CONTINUOUS
Status: CANCELLED | OUTPATIENT
Start: 2021-07-21

## 2021-07-21 NOTE — ANESTHESIA PREPROCEDURE EVALUATION
Procedure:  PRE-OP ONLY    Relevant Problems   ANESTHESIA  old and current chart reviewed and notes read      CARDIO  ECHO ordered never done RF at age   (+) HTN (hypertension)   (+) Hyperlipidemia      GI/HEPATIC  bowel prep      PULMONARY  smoking cessation stressed   (+) Smoking   (-) Sleep apnea   (-) URI (upper respiratory infection)        Physical Exam    Airway       Dental       Cardiovascular  Cardiovascular exam normal    Pulmonary  Pulmonary exam normal     Other Findings        Anesthesia Plan  ASA Score- 2     Anesthesia Type- IV sedation with anesthesia with ASA Monitors  Additional Monitors:   Airway Plan:           Plan Factors-Exercise tolerance (METS): >4 METS  Chart reviewed  EKG reviewed  Imaging results reviewed  Existing labs reviewed  Patient summary reviewed  Patient is a current smoker  Patient instructed to abstain from smoking on day of procedure  Patient did not smoke on day of surgery  Obstructive sleep apnea risk education given perioperatively  Induction- intravenous  Postoperative Plan-     Informed Consent- Anesthetic plan and risks discussed with patient  I personally reviewed this patient with the CRNA  Discussed and agreed on the Anesthesia Plan with the CRNA  Alana Lara

## 2021-07-23 ENCOUNTER — TELEPHONE (OUTPATIENT)
Dept: GASTROENTEROLOGY | Facility: MEDICAL CENTER | Age: 56
End: 2021-07-23

## 2021-07-26 ENCOUNTER — PREP FOR PROCEDURE (OUTPATIENT)
Dept: GASTROENTEROLOGY | Facility: CLINIC | Age: 56
End: 2021-07-26

## 2021-07-26 DIAGNOSIS — Z12.11 COLON CANCER SCREENING: Primary | ICD-10-CM

## 2021-07-26 NOTE — TELEPHONE ENCOUNTER
Colon scheduled on 8/26 at [facility]  with Dr Mckeon  I gave pt verbal instructions/mailed   Prep-Miralax/Dulcolax    Yuliet ok'ed to schedule in RM 1 at 8am

## 2021-08-25 ENCOUNTER — ANESTHESIA (OUTPATIENT)
Dept: ANESTHESIOLOGY | Facility: HOSPITAL | Age: 56
End: 2021-08-25

## 2021-08-25 ENCOUNTER — ANESTHESIA EVENT (OUTPATIENT)
Dept: GASTROENTEROLOGY | Facility: HOSPITAL | Age: 56
End: 2021-08-25

## 2021-08-25 ENCOUNTER — ANESTHESIA EVENT (OUTPATIENT)
Dept: ANESTHESIOLOGY | Facility: HOSPITAL | Age: 56
End: 2021-08-25

## 2021-08-25 ENCOUNTER — TELEPHONE (OUTPATIENT)
Dept: GASTROENTEROLOGY | Facility: HOSPITAL | Age: 56
End: 2021-08-25

## 2021-08-25 RX ORDER — SODIUM CHLORIDE 9 MG/ML
125 INJECTION, SOLUTION INTRAVENOUS CONTINUOUS
Status: CANCELLED | OUTPATIENT
Start: 2021-08-25

## 2021-08-26 ENCOUNTER — HOSPITAL ENCOUNTER (OUTPATIENT)
Dept: GASTROENTEROLOGY | Facility: HOSPITAL | Age: 56
Setting detail: OUTPATIENT SURGERY
Discharge: HOME | End: 2021-08-26
Attending: INTERNAL MEDICINE | Admitting: INTERNAL MEDICINE
Payer: COMMERCIAL

## 2021-08-26 ENCOUNTER — ANESTHESIA (OUTPATIENT)
Dept: GASTROENTEROLOGY | Facility: HOSPITAL | Age: 56
End: 2021-08-26

## 2021-08-26 VITALS
HEART RATE: 55 BPM | TEMPERATURE: 96.5 F | DIASTOLIC BLOOD PRESSURE: 85 MMHG | SYSTOLIC BLOOD PRESSURE: 150 MMHG | RESPIRATION RATE: 18 BRPM | OXYGEN SATURATION: 99 %

## 2021-08-26 DIAGNOSIS — Z12.11 COLON CANCER SCREENING: ICD-10-CM

## 2021-08-26 PROCEDURE — 88305 TISSUE EXAM BY PATHOLOGIST: CPT | Performed by: PATHOLOGY

## 2021-08-26 PROCEDURE — 45385 COLONOSCOPY W/LESION REMOVAL: CPT | Performed by: INTERNAL MEDICINE

## 2021-08-26 RX ORDER — LIDOCAINE HYDROCHLORIDE 10 MG/ML
INJECTION, SOLUTION EPIDURAL; INFILTRATION; INTRACAUDAL; PERINEURAL AS NEEDED
Status: DISCONTINUED | OUTPATIENT
Start: 2021-08-26 | End: 2021-08-26

## 2021-08-26 RX ORDER — SODIUM CHLORIDE, SODIUM LACTATE, POTASSIUM CHLORIDE, CALCIUM CHLORIDE 600; 310; 30; 20 MG/100ML; MG/100ML; MG/100ML; MG/100ML
INJECTION, SOLUTION INTRAVENOUS CONTINUOUS PRN
Status: DISCONTINUED | OUTPATIENT
Start: 2021-08-26 | End: 2021-08-26

## 2021-08-26 RX ORDER — PROPOFOL 10 MG/ML
INJECTION, EMULSION INTRAVENOUS AS NEEDED
Status: DISCONTINUED | OUTPATIENT
Start: 2021-08-26 | End: 2021-08-26

## 2021-08-26 RX ORDER — SIMETHICONE 20 MG/.3ML
EMULSION ORAL CODE/TRAUMA/SEDATION MEDICATION
Status: COMPLETED | OUTPATIENT
Start: 2021-08-26 | End: 2021-08-26

## 2021-08-26 RX ADMIN — SODIUM CHLORIDE, SODIUM LACTATE, POTASSIUM CHLORIDE, AND CALCIUM CHLORIDE: .6; .31; .03; .02 INJECTION, SOLUTION INTRAVENOUS at 07:58

## 2021-08-26 RX ADMIN — PROPOFOL 30 MG: 10 INJECTION, EMULSION INTRAVENOUS at 08:25

## 2021-08-26 RX ADMIN — LIDOCAINE HYDROCHLORIDE 50 MG: 10 INJECTION, SOLUTION EPIDURAL; INFILTRATION; INTRACAUDAL; PERINEURAL at 08:03

## 2021-08-26 RX ADMIN — PROPOFOL 20 MG: 10 INJECTION, EMULSION INTRAVENOUS at 08:05

## 2021-08-26 RX ADMIN — PROPOFOL 20 MG: 10 INJECTION, EMULSION INTRAVENOUS at 08:28

## 2021-08-26 RX ADMIN — PROPOFOL 30 MG: 10 INJECTION, EMULSION INTRAVENOUS at 08:23

## 2021-08-26 RX ADMIN — PROPOFOL 30 MG: 10 INJECTION, EMULSION INTRAVENOUS at 08:19

## 2021-08-26 RX ADMIN — PROPOFOL 20 MG: 10 INJECTION, EMULSION INTRAVENOUS at 08:10

## 2021-08-26 RX ADMIN — PROPOFOL 20 MG: 10 INJECTION, EMULSION INTRAVENOUS at 08:16

## 2021-08-26 RX ADMIN — Medication 40 MG: at 08:07

## 2021-08-26 RX ADMIN — PROPOFOL 20 MG: 10 INJECTION, EMULSION INTRAVENOUS at 08:12

## 2021-08-26 RX ADMIN — PROPOFOL 80 MG: 10 INJECTION, EMULSION INTRAVENOUS at 08:03

## 2021-08-26 RX ADMIN — PROPOFOL 20 MG: 10 INJECTION, EMULSION INTRAVENOUS at 08:08

## 2021-08-26 RX ADMIN — PROPOFOL 20 MG: 10 INJECTION, EMULSION INTRAVENOUS at 08:14

## 2021-08-26 RX ADMIN — PROPOFOL 30 MG: 10 INJECTION, EMULSION INTRAVENOUS at 08:21

## 2021-08-26 NOTE — H&P
History and Physical - [facility]  Beth Ku 54 y o  male MRN: [id number]      HPI: Beth Ku is a 54y o  year old male who presents for screening colonoscopy  Last colonoscopy was aborted due to poor prep  Not on any anticoagulants  On aspirin 81 mg daily  REVIEW OF SYSTEMS: Per the HPI, and otherwise unremarkable  Historical Information   Past Medical History:   Diagnosis Date    Rheumatic fever 1969     Past Surgical History:   Procedure Laterality Date    KNEE ARTHROSCOPY Left      Social History   Social History     Substance and Sexual Activity   Alcohol Use Yes     Social History     Substance and Sexual Activity   Drug Use Not Currently     Social History     Tobacco Use   Smoking Status Current Every Day Smoker    Packs/day: 0 50    Types: Cigarettes   Smokeless Tobacco Never Used     Family History   Problem Relation Age of Onset    Cancer Mother        Meds/Allergies   (Not in a hospital admission)    No current facility-administered medications for this encounter  No Known Allergies      PHYSICAL EXAM:    Objective   Blood pressure 157/90, pulse 57, temperature 97 5 °F (36 4 °C), temperature source Tympanic, resp  rate 17, SpO2 97 %  There is no height or weight on file to calculate BMI  Gen: NAD  CV: RRR  CHEST: Clear  ABD: Soft, NT/ND  EXT: No edema      ASSESSMENT AND PLAN:  This is a 54y o  year old male here for screening colonoscopy, and he is stable and optimized for his procedure  KELLY Hill  Gastroenterology Fellow  [facility]  Available on [email or web address]

## 2021-08-26 NOTE — ANESTHESIA PREPROCEDURE EVALUATION
Procedure:  COLONOSCOPY    Relevant Problems   CARDIO   (+) HTN (hypertension)   (+) Hyperlipidemia      PULMONARY   (+) Smoking        Physical Exam    Airway    Mallampati score: II  TM Distance: >3 FB  Neck ROM: full     Dental   Comment: Loose,     Cardiovascular      Pulmonary      Other Findings        Anesthesia Plan  ASA Score- 2     Anesthesia Type- IV sedation with anesthesia with ASA Monitors  Additional Monitors:   Airway Plan:     Comment: Back up GA  Plan Factors-Exercise tolerance (METS): >4 METS  Chart reviewed  Patient is a current smoker  Patient instructed to abstain from smoking on day of procedure  Patient smoked on day of surgery  Induction-     Postoperative Plan-     Informed Consent- Anesthetic plan and risks discussed with patient  I personally reviewed this patient with the CRNA  Discussed and agreed on the Anesthesia Plan with the CRNA  Vipin Garcia

## 2022-05-19 NOTE — ANESTHESIA POSTPROCEDURE EVALUATION
Post-Op Assessment Note    CV Status:  Stable  Pain Score: 0    Pain management: adequate     Mental Status:  Awake and sleepy   Hydration Status:  Euvolemic   PONV Controlled:  Controlled   Airway Patency:  Patent and adequate      Post Op Vitals Reviewed: Yes      Staff: CRNA         No complications documented      BP   99/54   Temp      Pulse 55   Resp 20   SpO2 95 Detail Level: Zone

## 2022-06-14 ENCOUNTER — HOSPITAL ENCOUNTER (EMERGENCY)
Facility: HOSPITAL | Age: 57
Discharge: HOME | End: 2022-06-14
Attending: EMERGENCY MEDICINE | Admitting: EMERGENCY MEDICINE
Payer: COMMERCIAL

## 2022-06-14 VITALS
BODY MASS INDEX: 27.34 KG/M2 | WEIGHT: 174.16 LBS | SYSTOLIC BLOOD PRESSURE: 158 MMHG | OXYGEN SATURATION: 98 % | HEIGHT: 67 IN | HEART RATE: 66 BPM | RESPIRATION RATE: 18 BRPM | DIASTOLIC BLOOD PRESSURE: 81 MMHG | TEMPERATURE: 98.2 F

## 2022-06-14 DIAGNOSIS — K04.7 DENTAL INFECTION: Primary | ICD-10-CM

## 2022-06-14 PROCEDURE — 99282 EMERGENCY DEPT VISIT SF MDM: CPT

## 2022-06-14 PROCEDURE — 99283 EMERGENCY DEPT VISIT LOW MDM: CPT | Performed by: EMERGENCY MEDICINE

## 2022-06-14 RX ORDER — AMOXICILLIN 500 MG/1
500 CAPSULE ORAL 3 TIMES DAILY
Qty: 21 CAPSULE | Refills: 0 | Status: SHIPPED | OUTPATIENT
Start: 2022-06-14 | End: 2022-06-21

## 2022-06-14 RX ORDER — NAPROXEN 500 MG/1
500 TABLET ORAL 2 TIMES DAILY WITH MEALS
Qty: 20 TABLET | Refills: 0 | Status: SHIPPED | OUTPATIENT
Start: 2022-06-14

## 2022-06-14 NOTE — ED NOTES
Patient assessed and discharged by ED provider prior to RN assessment       Tristin Delgado RN  06/14/22 [id number]

## 2022-06-14 NOTE — ED PROVIDER NOTES
History  Chief Complaint   Patient presents with    Dental Pain     Pt reports broken, infected upper left tooth that has now caused swelling and pain in left side of face  Difficulty keeping left eye open      63 y/o male with L upper tooth pain since yest  , no fevers, no n/v  Dental clinic told him to come to the ER first            Prior to Admission Medications   Prescriptions Last Dose Informant Patient Reported? Taking? Ascorbic Acid (vitamin C) 100 MG tablet   Yes No   Sig: Take 100 mg by mouth daily   aspirin (ECOTRIN LOW STRENGTH) 81 mg EC tablet   Yes No   Sig: Take 81 mg by mouth daily   co-enzyme Q-10 30 MG capsule   Yes No   Sig: Take 30 mg by mouth 3 (three) times a day   cyanocobalamin (VITAMIN B-12) 500 MCG tablet   Yes No   Sig: Take 500 mcg by mouth daily   hydrochlorothiazide (HYDRODIURIL) 25 mg tablet   No No   Sig: Take 1 tablet (25 mg total) by mouth daily   Patient not taking: Reported on 4/21/2021   rosuvastatin (CRESTOR) 20 MG tablet   No No   Sig: Take 1 tablet (20 mg total) by mouth daily   Patient not taking: Reported on 4/21/2021   thiamine (VITAMIN B1) 50 mg tablet   Yes No   Sig: Take 50 mg by mouth daily      Facility-Administered Medications: None       Past Medical History:   Diagnosis Date    Rheumatic fever 1969       Past Surgical History:   Procedure Laterality Date    KNEE ARTHROSCOPY Left        Family History   Problem Relation Age of Onset    Cancer Mother      I have reviewed and agree with the history as documented      E-Cigarette/Vaping    E-Cigarette Use Never User      E-Cigarette/Vaping Substances    Nicotine No     THC No     CBD No     Flavoring No     Other No     Unknown No      Social History     Tobacco Use    Smoking status: Current Every Day Smoker     Packs/day: 0 50     Types: Cigarettes    Smokeless tobacco: Never Used   Vaping Use    Vaping Use: Never used   Substance Use Topics    Alcohol use: Yes     Comment: twice monthly    Drug use: Not Currently       Review of Systems   Constitutional: Negative for fever  HENT: Positive for dental problem  Respiratory: Negative for shortness of breath  Cardiovascular: Negative for chest pain  Gastrointestinal: Negative for abdominal pain  Neurological: Negative for weakness  Physical Exam  Physical Exam  Constitutional:       Appearance: He is well-developed  HENT:      Head: Normocephalic and atraumatic  Mouth/Throat:     Pulmonary:      Effort: Pulmonary effort is normal  No respiratory distress  Musculoskeletal:         General: Normal range of motion  Cervical back: Normal range of motion  Skin:     General: Skin is warm and dry  Neurological:      Mental Status: He is alert  Vital Signs  ED Triage Vitals [06/14/22 1552]   Temperature Pulse Respirations Blood Pressure SpO2   98 2 °F (36 8 °C) 66 18 158/81 98 %      Temp Source Heart Rate Source Patient Position - Orthostatic VS BP Location FiO2 (%)   Oral Monitor Sitting Left arm --      Pain Score       No Pain           Vitals:    06/14/22 1552   BP: 158/81   Pulse: 66   Patient Position - Orthostatic VS: Sitting         Visual Acuity      ED Medications  Medications - No data to display    Diagnostic Studies  Results Reviewed     None                 No orders to display              Procedures  Procedures         ED Course                                             MDM    Disposition  Final diagnoses:   Dental infection     Time reflects when diagnosis was documented in both MDM as applicable and the Disposition within this note     Time User Action Codes Description Comment    6/14/2022  4:07 PM Morelia Voss Add [K04 7] Dental infection       ED Disposition     ED Disposition   Discharge    Condition   Stable    Date/Time   Tue Jun 14, 2022  4:07 PM    Comment   Selena [facility] discharge to home/self care                 Follow-up Information     Follow up With Specialties Details Why Contact Info Additional [address] [facility], [address] [facility]   [address]  [facility]  [address] [place] [address]  [phone] [address] [place] [address] Dentistry   [address] [id number]  [address], [address] [facility], [place], [place], [address], [phone]          Discharge Medication List as of 6/14/2022  4:09 PM      START taking these medications    Details   amoxicillin (AMOXIL) 500 mg capsule Take 1 capsule (500 mg total) by mouth 3 (three) times a day for 7 days, Starting Tue 6/14/2022, Until Tue 6/21/2022, Normal      naproxen (NAPROSYN) 500 mg tablet Take 1 tablet (500 mg total) by mouth 2 (two) times a day with meals, Starting Tue 6/14/2022, Normal         CONTINUE these medications which have NOT CHANGED    Details   Ascorbic Acid (vitamin C) 100 MG tablet Take 100 mg by mouth daily, Historical Med      aspirin (ECOTRIN LOW STRENGTH) 81 mg EC tablet Take 81 mg by mouth daily, Historical Med      co-enzyme Q-10 30 MG capsule Take 30 mg by mouth 3 (three) times a day, Historical Med      cyanocobalamin (VITAMIN B-12) 500 MCG tablet Take 500 mcg by mouth daily, Historical Med      hydrochlorothiazide (HYDRODIURIL) 25 mg tablet Take 1 tablet (25 mg total) by mouth daily, Starting Thu 4/8/2021, Normal      rosuvastatin (CRESTOR) 20 MG tablet Take 1 tablet (20 mg total) by mouth daily, Starting Thu 4/8/2021, Normal      thiamine (VITAMIN B1) 50 mg tablet Take 50 mg by mouth daily, Historical Med             No discharge procedures on file      PDMP Review     None          ED Provider  Electronically Signed by           Sophia Youssef MD  06/14/22 [phone]

## 2022-10-19 ENCOUNTER — OFFICE VISIT (OUTPATIENT)
Dept: DENTISTRY | Facility: CLINIC | Age: 57
End: 2022-10-19

## 2022-10-19 VITALS — TEMPERATURE: 93.3 F | DIASTOLIC BLOOD PRESSURE: 85 MMHG | HEART RATE: 61 BPM | SYSTOLIC BLOOD PRESSURE: 176 MMHG

## 2022-10-19 DIAGNOSIS — Z98.890 H/O ORAL SURGERY: Primary | ICD-10-CM

## 2022-10-19 RX ORDER — AMOXICILLIN 500 MG/1
500 CAPSULE ORAL EVERY 8 HOURS SCHEDULED
Qty: 21 CAPSULE | Refills: 0 | Status: SHIPPED | OUTPATIENT
Start: 2022-10-19 | End: 2022-10-26

## 2022-10-19 NOTE — PROGRESS NOTES
presents for a Limited  Exam CC ''my lower left tooth was hurting me''  Verbal consent for treatment given in addition to the forms  Patient also wanted to restart his denture  Treatment  Reviewed health history - Patient is ASA II  Consents signed: Yes     Perio: Slight bleeding  Pain Scale: 6  Caries Assessment: HIGH  Radiographs: existing pan reviewed    Treatment Plan: updated         Tooth #18 severly decayed distally all the way to the root on the distal and #23     Explained benefits and risks of procedure to pt, pt understands and consents to treatment  Signed and uploaded ext consent form to [facility] center  Applied topical benzocaine, administered 1 carps of 2% Lidocaine w/ 1:100,000 via IA block ,  2 carps 4% articaine 1:100,000 epi via   Tooth #18 and #23  luxated removed with forceps  Placed gauze soaked in NS and provided verbal and written POI  AmoxiRx to pt pharm  Pt tolerated procedure well, left satisfied and ambulatory  Universal Protocol    Other Assisting Provider: Yes, Marla (assistant)    Verbal consent obtained? YES  Written consent obtained? YES    Risks, benefits and alternatives discussed?: YES    Consent given by: Patient ()    Time Out  Immediately prior to the procedure a time out was called: YES    Time Out: 10:30 am     A time out verifies correct patient, procedure, equipment, support staff and site/side marked as required  Patient states understanding of procedure being performed: YES    Patient's understanding of procedure matches consent: YES    Procedure consent matches procedure scheduled: YES    Test results available and properly labeled: N/A    Site  Verified with the patient  YES    Radiology Images displayed and confirmed    If images not available, report reviewed:  YES    Required items - Required blood products, implants, devices and special equipment available: YES    Patient identity confirmed:  YES

## 2022-10-26 ENCOUNTER — OFFICE VISIT (OUTPATIENT)
Dept: DENTISTRY | Facility: CLINIC | Age: 57
End: 2022-10-26

## 2022-10-26 VITALS — SYSTOLIC BLOOD PRESSURE: 207 MMHG | DIASTOLIC BLOOD PRESSURE: 95 MMHG | TEMPERATURE: 97.8 F | HEART RATE: 60 BPM

## 2022-10-26 DIAGNOSIS — K04.4 ACUTE APICAL PERIODONTITIS OF PULPAL ORIGIN: Primary | ICD-10-CM

## 2022-10-26 PROCEDURE — D7140 EXTRACTION, ERUPTED TOOTH OR EXPOSED ROOT (ELEVATION AND/OR FORCEPS REMOVAL): HCPCS | Performed by: DENTIST

## 2022-10-26 PROCEDURE — WIS5000 PRELIMINARY IMPRESSIONS: Performed by: DENTIST

## 2022-10-27 NOTE — PROGRESS NOTES
presents for continued extractions of remaining molars  '  Verbal consent for treatment given in addition to the forms  Patient also wanted to restart his denture  Treatment     Reviewed health history - Patient is ASA II  Consents signed: Yes     Perio: Slight bleeding  Pain Scale: 6  Caries Assessment: HIGH  Radiographs: existing pan reviewed     Treatment Plan: updated          Tooth #1 and 31 severly decayed hopless      Explained benefits and risks of procedure to pt, pt understands and consents to treatment  Signed and uploaded ext consent form to [facility] center       Applied topical benzocaine, administered 1 carps of 3% carbo no epi via IA block ,  2 carps 4% articaine 1:100,000 epi via   Tooth #1 and 31  luxated removed with forceps  Placed gauze soaked in NS and provided verbal and written POI  Took Upper and lower alginates  Pt tolerated procedure well, left satisfied and ambulatory          Universal Protocol     Other Assisting Provider: Yes, Marla (assistant)     Verbal consent obtained? YES  Written consent obtained? YES     Risks, benefits and alternatives discussed?: YES     Consent given by: Patient ()     Time Out  Immediately prior to the procedure a time out was called: YES     Time Out: 11:30 am      A time out verifies correct patient, procedure, equipment, support staff and site/side marked as required      Patient states understanding of procedure being performed: YES     Patient's understanding of procedure matches consent: YES     Procedure consent matches procedure scheduled: YES     Test results available and properly labeled: N/A     Both Sites  Verified with the patient  YES     Radiology Images displayed and confirmed    If images not available, report reviewed:  YES     Required items - Required blood products, implants, devices and special equipment available: YES     Patient identity confirmed:  YES

## 2022-11-09 ENCOUNTER — OFFICE VISIT (OUTPATIENT)
Dept: DENTISTRY | Facility: CLINIC | Age: 57
End: 2022-11-09

## 2022-11-09 VITALS — DIASTOLIC BLOOD PRESSURE: 91 MMHG | TEMPERATURE: 97.8 F | SYSTOLIC BLOOD PRESSURE: 157 MMHG | HEART RATE: 69 BPM

## 2022-11-09 DIAGNOSIS — K08.109 TEETH MISSING: Primary | ICD-10-CM

## 2022-11-09 NOTE — PROGRESS NOTES
Reviewed health history-  Pt is ASA type II  Treatment consents signed: Yes   Perio: Non applicable   Pain Scale: 0   Caries Assessment: Non applicable     Radiographs: Films are current  Oral Hygiene instruction reviewed  Annual evaluation of soft tissue recommended  If teeth are present then routine prophy visits recommended to the pt  Dental Procedure:    Pt presents for max and moses wax rims for immediate dentures  PMH reviewed, no changes  Wax rims tried intraorally  Adjusted maxillary rim to quinn plane, proper bite, and tissue support  Adjusted mandibular rim to maxillary rim at VDO with proper tissue support  Marked midline, high smile line, and distal of canine lines  Notched rims and obtained bite registration with bluprint PVS   shade will  Be matched with the Upper A2 and confirmed by pt via mirror  Pt left satisfied and ambulatory  Prognosis is Good  Referrals Needed: No  Next visit: Exts and delivery of U/L immediate dentures

## 2022-12-21 ENCOUNTER — OFFICE VISIT (OUTPATIENT)
Dept: DENTISTRY | Facility: CLINIC | Age: 57
End: 2022-12-21

## 2022-12-21 VITALS — TEMPERATURE: 97.5 F | HEART RATE: 71 BPM | SYSTOLIC BLOOD PRESSURE: 150 MMHG | DIASTOLIC BLOOD PRESSURE: 95 MMHG

## 2022-12-21 DIAGNOSIS — K08.401 EDENTULISM, PARTIAL, CLASS I: Primary | ICD-10-CM

## 2022-12-21 NOTE — PROGRESS NOTES
Teeth Try-In     Pt presents for Teeth try-in for immediate dentures  Denture seated and patient noted it was comfortable  Pt verified satisfaction with shade, shape, and positioning of teeth  Verified occlusion of teeth  Even occlusal contacts throughout denture  Discussed expectations, and alternatives (implants) if patient is still not satisfied with long term fit  Pt is interested in  Implant in the future  Explained process of next visit, including extractions and post op care  Pt understood and left in good spirits  Dentures submitted to NDL      NV: extractions and immediate denture delivery upper + lower - 2 hours (when back from lab)  NNV: 2 days after follow up for immediate delivery   Please schedule both, thank you

## 2023-01-12 ENCOUNTER — OFFICE VISIT (OUTPATIENT)
Dept: CARDIOLOGY CLINIC | Facility: CLINIC | Age: 58
End: 2023-01-12

## 2023-01-12 DIAGNOSIS — Z72.0 TOBACCO USE: ICD-10-CM

## 2023-01-12 DIAGNOSIS — R07.2 PRECORDIAL PAIN: Primary | ICD-10-CM

## 2023-01-12 DIAGNOSIS — E78.2 MIXED HYPERLIPIDEMIA: ICD-10-CM

## 2023-01-12 DIAGNOSIS — Z86.79 HISTORY OF RHEUMATIC FEVER: ICD-10-CM

## 2023-01-12 DIAGNOSIS — K21.9 GASTROESOPHAGEAL REFLUX DISEASE, UNSPECIFIED WHETHER ESOPHAGITIS PRESENT: ICD-10-CM

## 2023-01-12 DIAGNOSIS — I10 PRIMARY HYPERTENSION: ICD-10-CM

## 2023-01-12 RX ORDER — PANTOPRAZOLE SODIUM 40 MG/1
40 TABLET, DELAYED RELEASE ORAL DAILY
Qty: 90 TABLET | Refills: 0 | Status: SHIPPED | OUTPATIENT
Start: 2023-01-12

## 2023-01-12 RX ORDER — PANTOPRAZOLE SODIUM 40 MG/1
40 TABLET, DELAYED RELEASE ORAL DAILY
Qty: 90 TABLET | Refills: 0 | Status: SHIPPED | OUTPATIENT
Start: 2023-01-12 | End: 2023-01-12 | Stop reason: SDUPTHER

## 2023-01-12 NOTE — PROGRESS NOTES
Cardiology Office Visit    Cristiana Damon  [id number]  1965    [facility]  [address] [facility] [address]  [address]      Dear Nirmala Hua,    I had the pleasure of seeing your patient at our [address] [facility] office today 1/12/2023  As you know he is a pleasant 62y o  year old male with a medical history as described below  Reason for office visit: Establish care for chest pain, hypertension, hyperlipidemia and ongoing tobacco use  1  Precordial pain  Assessment & Plan:  Patient reports intermittent left sided chest pain followed typically by 'gassy' pain often relieved with burping  Pain seems to have a GI component but he does have risk factors for coronary artery disease  10 year ASCVD risk score is 22 3% based on data available today  The 10-year ASCVD risk score (Benny VELEZ, et al , 2019) is: 22 3%    Values used to calculate the score:      Age: 62 years      Sex: Male      Is Non- : No      Diabetic: No      Tobacco smoker: Yes      Systolic Blood Pressure: 389 mmHg      Is BP treated: No      HDL Cholesterol: 47 mg/dL      Total Cholesterol: 262 mg/dL     Continue aspirin 81 mg daily  Would benefit from statin therapy for risk reduction  Ideally needs optimal blood pressure control but has been and remains reluctant to start medications (see discussion below)  I have recommended exercise stress test as well as echocardiogram to assess both for underlying coronary artery disease and to assess overall heart function and structure  Given there is a possible GI component, I will add pantoprazole 40 mg daily for at least 30 days and perhaps up to 90 days pending his clinical response  He is agreeable to trial of pantoprazole  Will plan close follow up in the office  Orders:  -     Echo stress test, exercise;  Future; Expected date: 01/12/2023  - pantoprazole (PROTONIX) 40 mg tablet; Take 1 tablet (40 mg total) by mouth daily    2  Primary hypertension  Assessment & Plan:  Blood pressure 158/80 initially on exam with similar repeat readings on both left and right arms  Patient is trying to make dietary modifications and has lost 11 pounds over the last 2 months  He has also cut back on tobacco use (from 1 ppd down to 10 cigarettes a day) and caffeine intake  Ideally patient should be on antihypertensive medication but given his reluctance to start prescriptions, I have recommended he continue his attempts at weight reduction and attempts at tobacco cessation and decreased EtOH use  I have asked him to monitor blood pressure intermittently at home  Will continue to discuss options for optimization of blood pressure  Orders:  -     POCT ECG  -     Echo complete w/ contrast if indicated; Future; Expected date: 01/12/2023    3  Mixed hyperlipidemia  Assessment & Plan:  Lipid panel 4/7/2021: C 262  T 173  H 47  L 180  Should have updated lipid panel at follow up  Pending results of updated lipid panel, will discus the addition of statin therapy given his high ASCVD risk  Orders:  -     Echo complete w/ contrast if indicated; Future; Expected date: 01/12/2023    4  Tobacco use  Assessment & Plan:  Patient was smoking 1 ppd but has cut back over the last 2 months to about 10 cigarettes a day  We discussed the need for complete tobacco cessation  I commended his efforts at cutting back on tobacco use  5  Gastroesophageal reflux disease, unspecified whether esophagitis present  Assessment & Plan:  Symptoms of feeling 'gassy' often relieved with burping  Intermittent heartburn symptoms  Unclear if all of his symptoms are related to GI etiology but suspect at least some may be  He is under a lot of stress due to his job which in and of itself can lead to some gastritis and he is on aspirin 81 mg a day     I have recommended a trial of pantoprazole 40 mg x 30 days and may extend that to 90 days pending his clinical response  If his symptoms resolve, I would still recommend GI evaluation as he is a smoker and should likely have EGD done at some point  Will arrange close follow up in the office  Orders:  -     pantoprazole (PROTONIX) 40 mg tablet; Take 1 tablet (40 mg total) by mouth daily    6  History of rheumatic fever  Assessment & Plan:  Patient has a history of rheumatic fever  Systolic ejection murmur on exam concerning for aortic sclerosis vs mild aortic stenosis  Echocardiogram ordered to assess heart structure and function  HPI     Javon Saini has a past medical history of hypertension, hyperlipidemia and tobacco use  He also has a history of rheumatic fever  Unclear history of possible angioplasty and cardiomyopathy  Javon Saini underwent cardiac catheterization at the age of 36 and tells me that he underwent balloon angioplasty to 'lower blood pressure'  This was done at [facility] in [place]  Details are unclear  Javon Saini has a history of heart murmur and known history of prior rheumatic fever  1/12/2023: Javon Saini presents to the office today to establish care for episodes of precordial pain as well as hypertension and hyperlipidemia  He describes left sided chest pain followed by feeling 'gassy'  He typically burps and pain will then go away  He tells me that the last episode he had was a few weeks ago but tells me that he was under a lot of stress at that time  He denies overt chest tightness or heaviness  He denies any shortness of breath or dyspnea on exertion  He does not occasional fluttering in his chest which seems to occur when he feels gassy  He does have intermittent heartburn symptoms  No edema in legs but does note occasional edema in hands  He does have occasional left leg sciatic pain  He tells me that his right ear 'popped' yesterday and now he has ringing in his ears as well as some drainage   He did have some dizziness last pm      Branden Jurado has been trying to lose weight and has lost 11 pounds over the last 2 months by cutting out white bread, EtOH, sugar and decreasing his coffee intake  He also has cut back on smoking from 1 pack per day to about 10 cigarettes a day  He tries to drink 1-3 liters of water a day  ECG today shows sinus bradycardia at 59 bpm with incomplete RBBB  Branden Jurado is not particularly fond of taking prescription medications  He 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  He does have left shoulder pain and is seeing orthopedics 1/13/2023  Patient Active Problem List   Diagnosis   • Primary hypertension   • Hyperlipidemia   • Tobacco use   • Precordial pain   • Gastroesophageal reflux disease   • History of rheumatic fever     Past Medical History:   Diagnosis Date   • Cardiomyopathy ([address] )     Unclear details   • History of angioplasty     Unclear details   • HTN (hypertension)     diagnosed 2020   • Hyperlipidemia    • Rheumatic fever 1969   • Tobacco use      Social History     Socioeconomic History   • Marital status: /Civil Union     Spouse name: Shun Rick   • Number of children: Not on file   • Years of education: Not on file   • Highest education level: Not on file   Occupational History   • Occupation: Business owner   Tobacco Use   • Smoking status: Every Day     Packs/day: 0 50     Years: 40 00     Pack years: 20 00     Types: Cigarettes   • Smokeless tobacco: Never   • Tobacco comments:     Actively cutting back and trying to abstain     Vaping Use   • Vaping Use: Never used   Substance and Sexual Activity   • Alcohol use: Yes     Comment: twice monthly   • Drug use: Never   • Sexual activity: Not on file     Comment: Defer   Other Topics Concern   • Not on file   Social History Narrative   • Not on file     Social Determinants of Health     Financial Resource Strain: Not on file   Food Insecurity: Not on file   Transportation Needs: Not on file   Physical Activity: Not on file   Stress: Not on file   Social Connections: Not on file   Intimate Partner Violence: Not on file   Housing Stability: Not on file      Family History   Problem Relation Age of Onset   • Cancer Mother    • Alcohol abuse Father 62   • Hypertension Father    • Alcohol abuse Brother    • Drug abuse Brother    • Lung disease Maternal Grandmother    • Lung disease Maternal Grandfather    • Lung disease Paternal Grandfather      Past Surgical History:   Procedure Laterality Date   • ANGIOPLASTY      age 36   • KNEE ARTHROSCOPY Left     teenager       Current Outpatient Medications:   •  aspirin (ECOTRIN LOW STRENGTH) 81 mg EC tablet, Take 162 mg by mouth every other day, Disp: , Rfl:   •  co-enzyme Q-10 30 MG capsule, Take 30 mg by mouth every other day, Disp: , Rfl:   •  Multiple Vitamins-Minerals (MENS MULTIVITAMIN PO), Take by mouth every other day, Disp: , Rfl:   •  pantoprazole (PROTONIX) 40 mg tablet, Take 1 tablet (40 mg total) by mouth daily, Disp: 90 tablet, Rfl: 0       No Known Allergies      Cardiac Test Results:    ECG 1/12/2023: Sinus bradycardia  59 bpm  IRBBB  Lipid panel 4/7/2021: C 262  T 173  H 47  L 180  HgA1c 4/7/2021: 5 8%  (Prediabetic 5 7-6 4%)  Review of Systems   Constitutional: Negative for activity change, appetite change and fatigue  Intentional weight loss   HENT: Positive for ear pain and tinnitus  Negative for congestion, hearing loss and trouble swallowing  Eyes: Negative for visual disturbance  Respiratory: Negative for cough, chest tightness, shortness of breath and wheezing  Cardiovascular: Positive for chest pain and palpitations (occasional fluttering when he feels gassy)  Negative for leg swelling  Gastrointestinal: Negative for abdominal distention, abdominal pain, nausea and vomiting         'feels gassy' at times  Increased belching  Heartburn  Genitourinary: Negative for difficulty urinating  Musculoskeletal: Negative for arthralgias  Occasional hand edema  Sciatic pain left leg  Skin: Negative for rash  Neurological: Positive for dizziness (last pm)  Negative for syncope and light-headedness  Hematological: Does not bruise/bleed easily  Psychiatric/Behavioral: Negative for confusion  The patient is not nervous/anxious  Increased job stress   All other systems reviewed and are negative  Vitals:    01/12/23 1536 01/13/23 1550 01/13/23 1553   BP: 158/80 152/78 158/72   BP Location:  Left arm Right arm   Pulse: 71     SpO2: 98%     Weight: 79 8 kg (176 lb)     Height: 5' 7" (1 702 m)       Vitals:    01/12/23 1536   Weight: 79 8 kg (176 lb)     Height: 5' 7" (170 2 cm)     Body mass index is 27 57 kg/m²  Physical Exam  Vitals reviewed  Constitutional:       Appearance: He is well-developed  HENT:      Head: Normocephalic and atraumatic  Eyes:      Conjunctiva/sclera: Conjunctivae normal       Pupils: Pupils are equal, round, and reactive to light  Neck:      Vascular: No JVD  Cardiovascular:      Rate and Rhythm: Normal rate and regular rhythm  Heart sounds: Murmur heard  Systolic murmur is present  No friction rub  No gallop  Pulmonary:      Effort: Pulmonary effort is normal       Breath sounds: Normal breath sounds  Abdominal:      General: Bowel sounds are normal       Palpations: Abdomen is soft  Musculoskeletal:      Cervical back: Normal range of motion  Skin:     General: Skin is warm and dry  Neurological:      Mental Status: He is alert and oriented to person, place, and time     Psychiatric:         Behavior: Behavior normal

## 2023-01-13 ENCOUNTER — APPOINTMENT (OUTPATIENT)
Dept: RADIOLOGY | Facility: MEDICAL CENTER | Age: 58
End: 2023-01-13

## 2023-01-13 ENCOUNTER — OFFICE VISIT (OUTPATIENT)
Dept: OBGYN CLINIC | Facility: MEDICAL CENTER | Age: 58
End: 2023-01-13

## 2023-01-13 VITALS
DIASTOLIC BLOOD PRESSURE: 88 MMHG | HEIGHT: 67 IN | SYSTOLIC BLOOD PRESSURE: 160 MMHG | HEART RATE: 73 BPM | WEIGHT: 176 LBS | BODY MASS INDEX: 27.62 KG/M2

## 2023-01-13 DIAGNOSIS — M62.838 CERVICAL PARASPINOUS MUSCLE SPASM: ICD-10-CM

## 2023-01-13 DIAGNOSIS — M25.512 LEFT SHOULDER PAIN, UNSPECIFIED CHRONICITY: ICD-10-CM

## 2023-01-13 DIAGNOSIS — S46.912A LEFT SHOULDER STRAIN, INITIAL ENCOUNTER: Primary | ICD-10-CM

## 2023-01-13 DIAGNOSIS — S76.012A HIP STRAIN, LEFT, INITIAL ENCOUNTER: ICD-10-CM

## 2023-01-13 NOTE — LETTER
January 13, 2023     [address]    Patient: Anthony Perez   YOB: 1965   Date of Visit: 1/13/2023       Dear Dr Aileen Gillis: Thank you for referring Ottoniel Buenrostro to me for evaluation  Below are my notes for this consultation  If you have questions, please do not hesitate to call me  I look forward to following your patient along with you           Sincerely,        Edilma Found, DO        CC: No Recipients

## 2023-01-13 NOTE — PROGRESS NOTES
Ortho Sports Medicine Shoulder Visit     Assesment:   left shoulder tendonitis, strain, cervical paraspinal muscle spasm, possible pinched nerve in neck    Plan:    Conservative treatment:    Ice to shoulder 1-2 times daily, for 20 minutes at a time  PT for ROM and strengthening to shoulder, rotator cuff, scapular stabilizers, also modalities, traction, motion of cervical spine  Also have them show HEP for hip  Home exercise program for shoulder, including ROM and strenthening  Instructions given to patient of what exercises to perform  If hip pain continues may refer to joint specialist    See back in 6-8 weeks     Imaging: All imaging from today was reviewed by myself and explained to the patient  Injection:    No Injection planned at this time  Surgery:     No surgery is recommended at this point, continue with conservative treatment plan as noted  History of Present Illness: The patient is a 62 y o , left hand dominant 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 referred to me by their primary care physician, seen in clinic for consultation of left shoulder pain  The patient denies a history of diabetes  The patient denies a history of thyroid disorder  Pain is located anterior, deep  The pain has been present for some time progressively worsening  The patient sustained no specific injury or trauma  He does binding and swing hammer  Today patient has pain lifting arm overhead, reaching back but does have full motion  Patient also has cervical pain which radiates down into scapula and into arm pit region  The pain is characterized as sharp, dull, achy  The pain is present daily  He has been doing stretching routine at home  Pain is improved by rest and NSAIDS  Pain is aggravated by overhead activity, reaching back, rotation and lifting   Symptoms include clicking  The patient denies weakness  The patient denies numbness and tingling       The patient has tried rest and NSAIDS  Shoulder Surgical History:  None    Past Medical, Social and Family History:  Past Medical History:   Diagnosis Date   • Cardiomyopathy ([facility] [address] )    • HTN (hypertension)     diagnosed 2020   • Rheumatic fever 1969     Past Surgical History:   Procedure Laterality Date   • ANGIOPLASTY      age 36   • KNEE ARTHROSCOPY Left     teenager     No Known Allergies  Current Outpatient Medications on File Prior to Visit   Medication Sig Dispense Refill   • aspirin (ECOTRIN LOW STRENGTH) 81 mg EC tablet Take 162 mg by mouth every other day     • co-enzyme Q-10 30 MG capsule Take 30 mg by mouth every other day     • Multiple Vitamins-Minerals (MENS MULTIVITAMIN PO) Take by mouth every other day     • pantoprazole (PROTONIX) 40 mg tablet Take 1 tablet (40 mg total) by mouth daily 90 tablet 0     No current facility-administered medications on file prior to visit       Social History     Socioeconomic History   • Marital status: /Civil Union     Spouse name: Not on file   • Number of children: Not on file   • Years of education: Not on file   • Highest education level: Not on file   Occupational History   • Not on file   Tobacco Use   • Smoking status: Every Day     Packs/day: 0 50     Years: 40 00     Pack years: 20 00     Types: Cigarettes   • Smokeless tobacco: Never   Vaping Use   • Vaping Use: Never used   Substance and Sexual Activity   • Alcohol use: Yes     Comment: twice monthly   • Drug use: Never   • Sexual activity: Not on file     Comment: defer   Other Topics Concern   • Not on file   Social History Narrative   • Not on file     Social Determinants of Health     Financial Resource Strain: Not on file   Food Insecurity: Not on file   Transportation Needs: Not on file   Physical Activity: Not on file   Stress: Not on file   Social Connections: Not on file   Intimate Partner Violence: Not on file   Housing Stability: Not on file         I have reviewed the past medical, surgical, social and family history, medications and allergies as documented in the EMR  Review of systems: ROS is negative other than that noted in the HPI  Constitutional: Negative for fatigue and fever  HENT: Negative for sore throat  Respiratory: Negative for shortness of breath  Cardiovascular: Negative for chest pain  Gastrointestinal: Negative for abdominal pain  Endocrine: Negative for cold intolerance and heat intolerance  Genitourinary: Negative for flank pain  Musculoskeletal: Negative for back pain  Skin: Negative for rash  Allergic/Immunologic: Negative for immunocompromised state  Neurological: Negative for dizziness  Psychiatric/Behavioral: Negative for agitation  Physical Exam:    Blood pressure 160/88, pulse 73, height 5' 7" (1 702 m), weight 79 8 kg (176 lb)  General/Constitutional: NAD, well developed, well nourished  HENT: Normocephalic, atraumatic  CV: Intact distal pulses, regular rate  Resp: No respiratory distress or labored breathing  Lymphatic: No lymphadenopathy palpated  Neuro: Alert and Oriented x 3, no focal deficits  Psych: Normal mood, normal affect, normal judgement, normal behavior  Skin: Warm, dry, no rashes, no erythema     Shoulder Exam (focused):     Shoulder focused exam:       RIGHT LEFT    Scapula Atrophy Negative Negative     Winging Negative Negative     Protraction Negative Negative    Rotator cuff SS 5/5 5/5     IS 5/5 5/5     SubS 5/5 5/5    ROM  120 active, 170 passive with pain      ER0 60 60     ER90 90 90     IR90 40 40     IRb T6 lumbar    TTP: AC Negative Negative     Biceps Negative Negative     Coracoid Negative Negative    Special Tests: O'Briens Negative Negative     Kohler-shear Negative Negative     Cross body Adduction Negative Negative     Speeds  Negative Negative     Shannan's Negative Negative     Whipple Negative Negative       Neer Negative Negative     Escalera Negative Negative    Instability: Apprehension & relocation not tested not tested     Load & shift not tested not tested    Other: Crank Negative Negative               UE NV Exam: +2 Radial pulses bilaterally  Sensation intact to light touch C5-T1 bilaterally, Radial/median/ulnar nerve motor intact      Bilateral elbow, wrist, and and forearm ROM full, painless with passive ROM, no ttp or crepitance throughout extremities below shoulder joint    Cervical ROM decreased with lateral rotation left, extension  TTP mid line cervical spine, left upper trapezius, medial scapular border  Shoulder Imaging    X-rays of the left shoulder were reviewed, which demonstrate mild glenohumeral and AC joint degenerative changes with cystic changes humeral head  I have reviewed the radiology report and do not currently have a radiology reading from [facility], but will check the result once the reading is performed          Scribe Attestation    I,:  He Fong am acting as a scribe while in the presence of the attending physician :       I,:  Gertrude Callejas, DO personally performed the services described in this documentation    as scribed in my presence :

## 2023-01-15 VITALS
DIASTOLIC BLOOD PRESSURE: 72 MMHG | OXYGEN SATURATION: 98 % | BODY MASS INDEX: 27.62 KG/M2 | WEIGHT: 176 LBS | HEIGHT: 67 IN | HEART RATE: 71 BPM | SYSTOLIC BLOOD PRESSURE: 158 MMHG

## 2023-01-15 PROBLEM — K21.9 GASTROESOPHAGEAL REFLUX DISEASE: Status: ACTIVE | Noted: 2023-01-15

## 2023-01-15 PROBLEM — Z72.0 TOBACCO USE: Status: ACTIVE | Noted: 2021-07-21

## 2023-01-15 PROBLEM — Z86.79 HISTORY OF RHEUMATIC FEVER: Status: ACTIVE | Noted: 2023-01-15

## 2023-01-16 NOTE — ASSESSMENT & PLAN NOTE
Blood pressure 158/80 initially on exam with similar repeat readings on both left and right arms  Patient is trying to make dietary modifications and has lost 11 pounds over the last 2 months  He has also cut back on tobacco use (from 1 ppd down to 10 cigarettes a day) and caffeine intake  Ideally patient should be on antihypertensive medication but given his reluctance to start prescriptions, I have recommended he continue his attempts at weight reduction and attempts at tobacco cessation and decreased EtOH use  I have asked him to monitor blood pressure intermittently at home  Will continue to discuss options for optimization of blood pressure 
Lipid panel 4/7/2021: C 262  T 173  H 47  L 180  Should have updated lipid panel at follow up  Pending results of updated lipid panel, will discus the addition of statin therapy given his high ASCVD risk 
Patient has a history of rheumatic fever  Systolic ejection murmur on exam concerning for aortic sclerosis vs mild aortic stenosis  Echocardiogram ordered to assess heart structure and function 
Patient reports intermittent left sided chest pain followed typically by 'gassy' pain often relieved with burping  Pain seems to have a GI component but he does have risk factors for coronary artery disease  10 year ASCVD risk score is 22 3% based on data available today  The 10-year ASCVD risk score (Benny VELEZ, et al , 2019) is: 22 3%    Values used to calculate the score:      Age: 62 years      Sex: Male      Is Non- : No      Diabetic: No      Tobacco smoker: Yes      Systolic Blood Pressure: 118 mmHg      Is BP treated: No      HDL Cholesterol: 47 mg/dL      Total Cholesterol: 262 mg/dL     Continue aspirin 81 mg daily  Would benefit from statin therapy for risk reduction  Ideally needs optimal blood pressure control but has been and remains reluctant to start medications (see discussion below)  I have recommended exercise stress test as well as echocardiogram to assess both for underlying coronary artery disease and to assess overall heart function and structure  Given there is a possible GI component, I will add pantoprazole 40 mg daily for at least 30 days and perhaps up to 90 days pending his clinical response  He is agreeable to trial of pantoprazole  Will plan close follow up in the office 
Patient was smoking 1 ppd but has cut back over the last 2 months to about 10 cigarettes a day  We discussed the need for complete tobacco cessation  I commended his efforts at cutting back on tobacco use 
Symptoms of feeling 'gassy' often relieved with burping  Intermittent heartburn symptoms  Unclear if all of his symptoms are related to GI etiology but suspect at least some may be  He is under a lot of stress due to his job which in and of itself can lead to some gastritis and he is on aspirin 81 mg a day  I have recommended a trial of pantoprazole 40 mg x 30 days and may extend that to 90 days pending his clinical response  If his symptoms resolve, I would still recommend GI evaluation as he is a smoker and should likely have EGD done at some point  Will arrange close follow up in the office 
Name band;

## 2023-04-27 ENCOUNTER — TELEPHONE (OUTPATIENT)
Dept: CARDIOLOGY CLINIC | Facility: CLINIC | Age: 58
End: 2023-04-27

## 2023-05-03 ENCOUNTER — OFFICE VISIT (OUTPATIENT)
Dept: DENTISTRY | Facility: CLINIC | Age: 58
End: 2023-05-03

## 2023-05-03 VITALS — DIASTOLIC BLOOD PRESSURE: 89 MMHG | HEART RATE: 86 BPM | TEMPERATURE: 98.5 F | SYSTOLIC BLOOD PRESSURE: 171 MMHG

## 2023-05-03 DIAGNOSIS — K04.7 DENTAL INFECTION: Primary | ICD-10-CM

## 2023-05-03 NOTE — PROGRESS NOTES
Pt presents for extraction of #4,8,9,13,21,22,27 and delivery of immediate dentures  Verbal consent for treatment given in addition to the forms  Surgical procedure: Risks, benefits and complications described in detail, all questions answered  Consent signed  Local anesthesia administered via local infiltration using a total of 4 carpules of 4% Articaine with 1:100,000  Periosteal elevator used to raise a full thickness, mucoperiosteal flap on each tooth  Teeth #4,8,9,13,21,22,27 luxated with straight elevator and extracted using 150 and 151 and RYAN forceps  Socket curetted, granulation tissue removed and sockets irrigated with copious NS  No purulence  Hemostasis with gauze pressure dressing  He tolerated surgery well with no complications  Post-Op plan: Post-op instructions reviewed in detail with patient including standard dry socket precautions  Written instructions given along with gauze and monoject syringe  1  Soft, non-chew diet until pain resolves  2  OH instructions: Warm saline rinses after meals starting tomorrow  Brush teeth twice daily with fluoridated toothpaste being gentle around extraction sites  3  Pain: 600mg Ibuprofen PO q6h x 48 hours then PRN pain  Take with food and water to prevent gastritis  May alternate with 500mg APAP PO q6h PRN pain  4  Return to clinic if any complications arise  5  Re-appoint with regular provider for comprehensive treatment plan and continued care  Universal Protocol    Other Assisting Provider: Yes, Cristiane (assistant)    Verbal consent obtained? YES  Written consent obtained? YES    Risks, benefits and alternatives discussed?: YES    Consent given by: Patient ()    Time Out  Immediately prior to the procedure a time out was called: YES    Time Out:  1:00 pm     A time out verifies correct patient, procedure, equipment, support staff and site/side marked as required      Patient states understanding of procedure being performed: YES    Patient's understanding of procedure matches consent: YES    Procedure consent matches procedure scheduled: YES    Test results available and properly labeled: N/A    Site  Verified with the patient  YES    Radiology Images displayed and confirmed    If images not available, report reviewed:  YES    Required items - Required blood products, implants, devices and special equipment available: YES    Patient identity confirmed:  YES

## 2023-05-08 ENCOUNTER — OFFICE VISIT (OUTPATIENT)
Dept: DENTISTRY | Facility: CLINIC | Age: 58
End: 2023-05-08

## 2023-05-08 DIAGNOSIS — K04.7 DENTAL INFECTION: Primary | ICD-10-CM

## 2023-05-08 RX ORDER — AMOXICILLIN 500 MG/1
500 CAPSULE ORAL EVERY 8 HOURS SCHEDULED
Qty: 21 CAPSULE | Refills: 0 | Status: SHIPPED | OUTPATIENT
Start: 2023-05-08 | End: 2023-05-15

## 2023-05-08 NOTE — PROGRESS NOTES
"Post Op Denture Adjustment    Pt presents as an emergency for denture adjustment 5 days post op immediate delivery  Pt reports he is in 10/10 pain that is radiating up towards his eyes and giving him a headache  Pt reports pain is mostly on the back of his mouth  Pt reports the denture was gagging him and he was throwing up blood at last visit so he has not put the denture back in since the extractions/delivery on Wednesday of last week  Pt reports he has not been able to eat and he is upset that he has had to go to work without teeth  Pt is taking nothing for the pain  IOE: extraction sites healing well, no active purulence, no bleeding or abnormal swelling  Sutures are intact  Plaque build upon the posterior ridges  Maxillary buccal exostosis present  Denture could not be tried in due to pt expressing pain upon seating  Discussed with pt that feeling pain after surgery is normal  Advised pt to take tylenol and advil alternating for the pain  Discussed with pt the need for alveoplasty due to bony growths in order to get denture to fit better with less pain  Pt was not happy to hear that and was upset about the idea of \"cutting his bone off\"  Explained to the patient that in order to adjust the denture we will need to numb the areas of pain, since we cannot seat the denture or perform a soft reline without the pt feeling pain  Pt was not interested in numbing the areas because he did not want anymore needles  Advised pt if we do not adjust the denture today he will most likely not be able to wear it  Pt expressed anger and disappointment and wanted to leave without any adjustments today  Advised pt to take the amoxicillin for 7 days 3x/day and we will adjust the denture after course in about 2 weeks  Amox rx sent to pharmacy       NV: 2 week denture adjustment after amox course is taken   "

## 2023-05-09 ENCOUNTER — TELEPHONE (OUTPATIENT)
Dept: CARDIOLOGY CLINIC | Facility: CLINIC | Age: 58
End: 2023-05-09

## 2023-05-09 DIAGNOSIS — I10 PRIMARY HYPERTENSION: Primary | ICD-10-CM

## 2023-05-09 DIAGNOSIS — E78.2 MIXED HYPERLIPIDEMIA: ICD-10-CM

## 2023-05-09 NOTE — TELEPHONE ENCOUNTER
Pt states that his BP has been in the 190/100 area  States that he is in a lot of pain in his mouth, and under a lot of stress  scheduled for Monday

## 2023-05-09 NOTE — TELEPHONE ENCOUNTER
Called patient and left message to r/s his 5/18/23 appointment with Dr Jose De Jesus Zhao    Patient called back and left message on office voicemail that he would like a sooner appointment because his BP is still very high but did not leave how high is BP is    Please advise

## 2023-05-09 NOTE — TELEPHONE ENCOUNTER
I ordered blood work that I would like for him to complete now in preparation for his visit    Thank you

## 2023-05-10 NOTE — TELEPHONE ENCOUNTER
"Pt states \" I cannot just show up when you people want me to, you know that right? \" I tried to explain that I was not requesting him to come here now, we just need labs prior to his appointment  Pt is aware     "

## 2023-05-11 ENCOUNTER — APPOINTMENT (OUTPATIENT)
Age: 58
End: 2023-05-11

## 2023-05-11 DIAGNOSIS — Z00.8 HEALTH EXAMINATION IN POPULATION SURVEY: ICD-10-CM

## 2023-05-11 LAB
ALBUMIN SERPL BCP-MCNC: 4 G/DL (ref 3.5–5)
ALP SERPL-CCNC: 73 U/L (ref 46–116)
ALT SERPL W P-5'-P-CCNC: 28 U/L (ref 12–78)
ANION GAP SERPL CALCULATED.3IONS-SCNC: 1 MMOL/L (ref 4–13)
AST SERPL W P-5'-P-CCNC: 22 U/L (ref 5–45)
BILIRUB SERPL-MCNC: 0.49 MG/DL (ref 0.2–1)
BUN SERPL-MCNC: 14 MG/DL (ref 5–25)
CALCIUM SERPL-MCNC: 9.9 MG/DL (ref 8.3–10.1)
CHLORIDE SERPL-SCNC: 104 MMOL/L (ref 96–108)
CHOLEST SERPL-MCNC: 225 MG/DL
CO2 SERPL-SCNC: 30 MMOL/L (ref 21–32)
CREAT SERPL-MCNC: 1.1 MG/DL (ref 0.6–1.3)
EST. AVERAGE GLUCOSE BLD GHB EST-MCNC: 123 MG/DL
GFR SERPL CREATININE-BSD FRML MDRD: 74 ML/MIN/1.73SQ M
GLUCOSE P FAST SERPL-MCNC: 111 MG/DL (ref 65–99)
HBA1C MFR BLD: 5.9 %
HDLC SERPL-MCNC: 49 MG/DL
LDLC SERPL CALC-MCNC: 152 MG/DL (ref 0–100)
NONHDLC SERPL-MCNC: 176 MG/DL
POTASSIUM SERPL-SCNC: 4 MMOL/L (ref 3.5–5.3)
PROT SERPL-MCNC: 7.8 G/DL (ref 6.4–8.4)
SODIUM SERPL-SCNC: 135 MMOL/L (ref 135–147)
TRIGL SERPL-MCNC: 119 MG/DL

## 2023-05-15 ENCOUNTER — OFFICE VISIT (OUTPATIENT)
Dept: CARDIOLOGY CLINIC | Facility: CLINIC | Age: 58
End: 2023-05-15

## 2023-05-15 VITALS
DIASTOLIC BLOOD PRESSURE: 82 MMHG | TEMPERATURE: 98.3 F | SYSTOLIC BLOOD PRESSURE: 172 MMHG | OXYGEN SATURATION: 96 % | HEIGHT: 67 IN | HEART RATE: 72 BPM | WEIGHT: 178.4 LBS | BODY MASS INDEX: 28 KG/M2

## 2023-05-15 DIAGNOSIS — K21.9 GASTROESOPHAGEAL REFLUX DISEASE WITHOUT ESOPHAGITIS: ICD-10-CM

## 2023-05-15 DIAGNOSIS — Z72.0 TOBACCO USE: ICD-10-CM

## 2023-05-15 DIAGNOSIS — R07.2 PRECORDIAL PAIN: ICD-10-CM

## 2023-05-15 DIAGNOSIS — I10 PRIMARY HYPERTENSION: Primary | ICD-10-CM

## 2023-05-15 DIAGNOSIS — Z86.79 HISTORY OF RHEUMATIC FEVER: ICD-10-CM

## 2023-05-15 DIAGNOSIS — E78.2 MIXED HYPERLIPIDEMIA: ICD-10-CM

## 2023-05-15 RX ORDER — LOSARTAN POTASSIUM 25 MG/1
25 TABLET ORAL DAILY
Qty: 30 TABLET | Refills: 11 | Status: SHIPPED | OUTPATIENT
Start: 2023-05-15 | End: 2023-05-30

## 2023-05-15 NOTE — PROGRESS NOTES
Cardiology Follow Up    Edelmira Cooks  1965  [id number]  [facility] [address]  [address]  [phone]    Roxana Contreras presents for routine follow up for chest pain, hypertension, hyperlipidemia and ongoing tobacco use       1  Primary hypertension  Assessment & Plan:  Blood pressure remains significantly elevated  Will start losartan 25 mg daily  Continue with 2 g sodium diet, weight loss efforts, and smoking cessation  BMP in 2 weeks  I have asked him to monitor blood pressure intermittently at home  Bring home BP cuff to next OV in 1 month  Orders:  -     losartan (COZAAR) 25 mg tablet; Take 1 tablet (25 mg total) by mouth daily  -     Basic metabolic panel; Future; Expected date: 05/29/2023    2  Mixed hyperlipidemia  Assessment & Plan:  Lipid panel 5/11/2023: C 225  T 119  H 49  L 152  Discussed benefit of statin therapy which he agrees to discuss at next OV in 1 month  3  Tobacco use  Assessment & Plan:  Patient was smoking 1 ppd but has cut back over the last 2 months to about 10 cigarettes a day  We discussed the need for complete tobacco cessation  I commended his efforts at cutting back on tobacco use  4  Precordial pain  Assessment & Plan:  Patient reported intermittent left sided chest pain followed typically by 'gassy' pain often relieved with burping  Pain seems to have a GI component but he does have risk factors for coronary artery disease  10 year ASCVD risk score is 22 3% based on data available today     The 10-year ASCVD risk score (Benny VELEZ, et al , 2019) is: 24 8%    Values used to calculate the score:      Age: 62 years      Sex: Male      Is Non- : No      Diabetic: No      Tobacco smoker: Yes      Systolic Blood Pressure: 083 mmHg      Is BP treated: Yes      HDL Cholesterol: 49 mg/dL      Total Cholesterol: 225 mg/dL     Continue aspirin 81 mg "daily  Would benefit from statin therapy for risk reduction which we will start at next OV  Ideally needs optimal blood pressure control and will start losartan 25 mg daily today  Exercise stress test as well as echocardiogram to assess both for underlying coronary artery disease and to assess overall heart function and structure ordered at previous OV which he has not yet scheduled  Will arrange for these today  Close follow up arranged in 1 month  5  Gastroesophageal reflux disease without esophagitis  Assessment & Plan:  Noted feeling 'gassy' often relieved with burping at initial visit  Intermittent heartburn symptoms  Symptoms resolved with pantoprazole 40 mg daily  Recommend GI evaluation, which was discussed today  6  History of rheumatic fever  Assessment & Plan:  Patient has a history of rheumatic fever  Systolic ejection murmur on exam concerning for aortic sclerosis vs mild aortic stenosis  Echocardiogram ordered to assess heart structure and function  HPI   Severiano Ruse has a past medical history of hypertension, hyperlipidemia and tobacco use  He also has a history of rheumatic fever  Unclear history of possible angioplasty and cardiomyopathy      Esteban underwent cardiac catheterization at the age of 36 and tells me that he underwent balloon angioplasty to 'lower blood pressure'  This was done at [facility] in [place]  Details are unclear       Severiano Ruse has a history of heart murmur and known history of prior rheumatic fever  He established care with Dr Edmar Daniels on 1/12/2023 for evaluation of hypertension, hyperlipidemia, and chest pain  He described left sided chest pain followed by a \"gassy\" feeling  Pain was relieved with belching  Denied overt chest pain or heaviness  No shortness of breath or dyspnea on exertion  Occasional \"fluttering\" in his chest  No edema in his legs but occasional edema in his hands   He was actively working on weight loss and was down 11 pounds " in 2 months  He had cut back on cigarettes from 1 PPD to 10 cig daily  ECG showed SB at 59 bpm and incomplete RBBB  An echocardiogram and exercise stress test were ordered  Pantoprazole 40 mg daily x 30 days was prescribed  He was instructed to monitor his blood pressures      5/15/2023: Tristian Angeles presents today for routine follow up  He never scheduled the echocardiogram or stress test ordered at his previous OV's  He continues to smoke about 1/2 PPD  His blood pressures have been elevated in the 228-703'D systolic at home  He did not bring his home cuff with him today  He continues with significant work stress  He is trying to eat right and continue to lose weight  He completed screening labs through his insurance on 5/11/2023 and results are reviewed with him today with HA1c 5 9 and   He reports resolution of the chest discomfort with starting pantoprazole  Medical Problems     Problem List     Primary hypertension    Hyperlipidemia    Tobacco use    Precordial pain    Gastroesophageal reflux disease    History of rheumatic fever        Past Medical History:   Diagnosis Date   • Cardiomyopathy ([facility] [address] )     Unclear details   • History of angioplasty     Unclear details   • HTN (hypertension)     diagnosed 2020   • Hyperlipidemia    • Rheumatic fever 1969   • Tobacco use      Social History     Socioeconomic History   • Marital status: /Civil Union     Spouse name: Taz Temple   • Number of children: Not on file   • Years of education: Not on file   • Highest education level: Not on file   Occupational History   • Occupation: Business owner   Tobacco Use   • Smoking status: Every Day     Packs/day: 0 50     Years: 40 00     Pack years: 20 00     Types: Cigarettes   • Smokeless tobacco: Never   • Tobacco comments:     Actively cutting back and trying to abstain     Vaping Use   • Vaping Use: Never used   Substance and Sexual Activity   • Alcohol use: Yes     Comment: twice monthly   • Drug use: Never   • Sexual activity: Not on file     Comment: Defer   Other Topics Concern   • Not on file   Social History Narrative   • Not on file     Social Determinants of Health     Financial Resource Strain: Not on file   Food Insecurity: Not on file   Transportation Needs: Not on file   Physical Activity: Not on file   Stress: Not on file   Social Connections: Not on file   Intimate Partner Violence: Not on file   Housing Stability: Not on file      Family History   Problem Relation Age of Onset   • Cancer Mother    • Alcohol abuse Father 62   • Hypertension Father    • Alcohol abuse Brother    • Drug abuse Brother    • Lung disease Maternal Grandmother    • Lung disease Maternal Grandfather    • Lung disease Paternal Grandfather      Past Surgical History:   Procedure Laterality Date   • ANGIOPLASTY      age 36   • DENTAL SURGERY  04/2023   • KNEE ARTHROSCOPY Left     teenager       Current Outpatient Medications:   •  aspirin (ECOTRIN LOW STRENGTH) 81 mg EC tablet, Take 162 mg by mouth every other day, Disp: , Rfl:   •  losartan (COZAAR) 25 mg tablet, Take 1 tablet (25 mg total) by mouth daily, Disp: 30 tablet, Rfl: 11  •  Multiple Vitamins-Minerals (MENS MULTIVITAMIN PO), Take by mouth every other day, Disp: , Rfl:   •  pantoprazole (PROTONIX) 40 mg tablet, Take 1 tablet (40 mg total) by mouth daily, Disp: 90 tablet, Rfl: 0  •  co-enzyme Q-10 30 MG capsule, Take 30 mg by mouth every other day (Patient not taking: Reported on 5/15/2023), Disp: , Rfl:   No Known Allergies    Labs:     Chemistry        Component Value Date/Time    K 4 0 05/11/2023 0703     05/11/2023 0703    CO2 30 05/11/2023 0703    BUN 14 05/11/2023 0703    CREATININE 1 10 05/11/2023 0703        Component Value Date/Time    CALCIUM 9 9 05/11/2023 0703    ALKPHOS 73 05/11/2023 0703    AST 22 05/11/2023 0703    ALT 28 05/11/2023 0703        Lipid panel 5/11/2023: C 225  T 119  H 49  L 152  HA1c 5/11/2023: 5 9    ECG 1/12/2023: Sinus bradycardia  59 bpm  IRBBB  "     Lipid panel 4/7/2021: C 262  T 173  H 47  L 180       HgA1c 4/7/2021: 5 8%  (Prediabetic 5 7-6 4%)  Review of Systems   Constitutional: Negative  HENT: Negative  Cardiovascular: Positive for dyspnea on exertion  Negative for chest pain, irregular heartbeat, leg swelling, near-syncope, orthopnea and palpitations  Respiratory: Negative for cough, shortness of breath and snoring  Endocrine: Negative  Skin: Negative  Musculoskeletal: Negative  Gastrointestinal: Negative  Genitourinary: Negative  Neurological: Negative  Psychiatric/Behavioral: Negative  Vitals:    05/15/23 1111   BP: (!) 172/82   Pulse: 72   Temp: 98 3 °F (36 8 °C)   SpO2: 96%     Vitals:    05/15/23 1111   Weight: 80 9 kg (178 lb 6 4 oz)     Height: 5' 7\" (170 2 cm)   Body mass index is 27 94 kg/m²  Physical Exam  Vitals and nursing note reviewed  Constitutional:       General: He is not in acute distress  Appearance: He is well-developed  He is not diaphoretic  HENT:      Head: Normocephalic and atraumatic  Neck:      Vascular: No carotid bruit or JVD  Cardiovascular:      Rate and Rhythm: Normal rate and regular rhythm  No extrasystoles are present  Pulses: Intact distal pulses  Heart sounds: S1 normal and S2 normal  Murmur heard  Systolic murmur is present  No friction rub  No gallop  Comments: No edema  Pulmonary:      Effort: Pulmonary effort is normal  No respiratory distress  Breath sounds: Normal breath sounds  Abdominal:      General: There is no distension  Palpations: Abdomen is soft  Tenderness: There is no abdominal tenderness  Skin:     General: Skin is warm and dry  Findings: No rash  Neurological:      Mental Status: He is alert and oriented to person, place, and time     Psychiatric:         Mood and Affect: Mood and affect normal          Speech: Speech normal          Behavior: Behavior normal                 "

## 2023-05-15 NOTE — PATIENT INSTRUCTIONS
"Start losartan 25 mg every morning  BMP lab test in 2 weeks to monitor your kidney function and potassium  We will reschedule your echocardiogram and stress test   I would like to see better blood pressure control prior to the stress test   Follow up with me within 1 month  Bring your home BP cuff with you when you come  Continue with your current dietary modifications as well as quitting smoking  I recommend switching out white rice for brown rice and white potatoes for sweet potatoes  We will discuss cholesterol treatment at your next visit  I recommend routine follow up with the primary care provider for management of your blood sugars  Your Ha1c was impaired - meaning \"pre-diabetes\"     "

## 2023-05-22 ENCOUNTER — OFFICE VISIT (OUTPATIENT)
Dept: DENTISTRY | Facility: CLINIC | Age: 58
End: 2023-05-22

## 2023-05-22 VITALS — SYSTOLIC BLOOD PRESSURE: 160 MMHG | HEART RATE: 59 BPM | DIASTOLIC BLOOD PRESSURE: 95 MMHG

## 2023-05-22 DIAGNOSIS — K08.109 EDENTULISM: Primary | ICD-10-CM

## 2023-05-22 NOTE — ASSESSMENT & PLAN NOTE
Patient was smoking 1 ppd but has cut back over the last 2 months to about 10 cigarettes a day  We discussed the need for complete tobacco cessation  I commended his efforts at cutting back on tobacco use

## 2023-05-22 NOTE — ASSESSMENT & PLAN NOTE
Lipid panel 5/11/2023: C 225  T 119  H 49  L 152  Discussed benefit of statin therapy which he agrees to discuss at next OV in 1 month

## 2023-05-22 NOTE — ASSESSMENT & PLAN NOTE
Patient has a history of rheumatic fever  Systolic ejection murmur on exam concerning for aortic sclerosis vs mild aortic stenosis  Echocardiogram ordered to assess heart structure and function

## 2023-05-22 NOTE — ASSESSMENT & PLAN NOTE
Patient reported intermittent left sided chest pain followed typically by 'gassy' pain often relieved with burping  Pain seems to have a GI component but he does have risk factors for coronary artery disease  10 year ASCVD risk score is 22 3% based on data available today  The 10-year ASCVD risk score (Benny VELEZ, et al , 2019) is: 24 8%    Values used to calculate the score:      Age: 62 years      Sex: Male      Is Non- : No      Diabetic: No      Tobacco smoker: Yes      Systolic Blood Pressure: 692 mmHg      Is BP treated: Yes      HDL Cholesterol: 49 mg/dL      Total Cholesterol: 225 mg/dL     Continue aspirin 81 mg daily  Would benefit from statin therapy for risk reduction which we will start at next OV  Ideally needs optimal blood pressure control and will start losartan 25 mg daily today  Exercise stress test as well as echocardiogram to assess both for underlying coronary artery disease and to assess overall heart function and structure ordered at previous OV which he has not yet scheduled  Will arrange for these today  Close follow up arranged in 1 month  82 yo female on lasix, eliquis presents with bilteral foot swelling and left leg swelling. stated she takes her medication regularly. Patient denies chest pain, shortness of breath, nausea, vomiting, trauma.

## 2023-05-22 NOTE — ASSESSMENT & PLAN NOTE
Noted feeling 'gassy' often relieved with burping at initial visit  Intermittent heartburn symptoms  Symptoms resolved with pantoprazole 40 mg daily  Recommend GI evaluation, which was discussed today

## 2023-05-22 NOTE — PROGRESS NOTES
Denture Adjustment    Pt presents for denture adjustment  Denture seated Pt stated he liked the aesthetics  Informed pt that he is still not ready for a hard reline as he is still healing  Offered soft reline today  Explained that soft reline is temporary and may require replacement, pt understood and stated a preference for a soft reline  Intaglio of immediate denture cleaned  Prepared intaglio with soft reline adhesive  Applied soft reline material and placed orally  Placed into arch and pt held firmly for 6 min and denture removed  Soft reline allowed to harden for additional 10 min  Excess trimmed with 15c blade  Pt confirmed improved fit  Advised patient to return if she feels any pain or discomfort when wearing dentures for adjustments  Pt understood and left in good spirits       NV: Adjustment if necessary

## 2023-05-22 NOTE — ASSESSMENT & PLAN NOTE
Blood pressure remains significantly elevated  Will start losartan 25 mg daily  Continue with 2 g sodium diet, weight loss efforts, and smoking cessation  BMP in 2 weeks  I have asked him to monitor blood pressure intermittently at home  Bring home BP cuff to next OV in 1 month

## 2023-05-30 ENCOUNTER — TELEPHONE (OUTPATIENT)
Dept: CARDIOLOGY CLINIC | Facility: CLINIC | Age: 58
End: 2023-05-30

## 2023-05-30 DIAGNOSIS — I10 PRIMARY HYPERTENSION: ICD-10-CM

## 2023-05-30 RX ORDER — LOSARTAN POTASSIUM 50 MG/1
50 TABLET ORAL DAILY
Qty: 30 TABLET | Refills: 11 | Status: SHIPPED | OUTPATIENT
Start: 2023-05-30 | End: 2023-06-05 | Stop reason: SDUPTHER

## 2023-05-30 NOTE — TELEPHONE ENCOUNTER
"received call from patient's spouse she stated that patient has started Losartan but has not seen a change in his BP  Reports readings as 180/100, 190/100, 170/101  She was unsure if patient took  readings 2 hours after taking medication  She reports that \" it does drop to 158/80 usually but only lasts 8-10 hours  When he gets home after work it is back up\"  She is asking if patient can take medication twice daily? Please advise     "

## 2023-06-02 NOTE — TELEPHONE ENCOUNTER
Patient had called office back and informed office that hsi BP has been running 150-1260/100 and pulse is 88-90

## 2023-06-03 ENCOUNTER — HOSPITAL ENCOUNTER (OUTPATIENT)
Dept: NON INVASIVE DIAGNOSTICS | Facility: HOSPITAL | Age: 58
Discharge: HOME | End: 2023-06-03
Attending: INTERNAL MEDICINE
Payer: COMMERCIAL

## 2023-06-03 VITALS
HEIGHT: 67 IN | WEIGHT: 178 LBS | DIASTOLIC BLOOD PRESSURE: 80 MMHG | BODY MASS INDEX: 27.94 KG/M2 | SYSTOLIC BLOOD PRESSURE: 160 MMHG

## 2023-06-03 DIAGNOSIS — I10 PRIMARY HYPERTENSION: ICD-10-CM

## 2023-06-03 DIAGNOSIS — E78.2 MIXED HYPERLIPIDEMIA: ICD-10-CM

## 2023-06-03 LAB
AORTIC ROOT: 3.2 CM
APICAL FOUR CHAMBER EJECTION FRACTION: 62 %
E WAVE DECELERATION TIME: 204 MS
FRACTIONAL SHORTENING: 29 % (ref 28–44)
INTERVENTRICULAR SEPTUM IN DIASTOLE (PARASTERNAL SHORT AXIS VIEW): 1.5 CM
INTERVENTRICULAR SEPTUM: 1.5 CM (ref 0.6–1.1)
LAAS-AP2: 14.9 CM2
LAAS-AP4: 12.4 CM2
LEFT ATRIUM SIZE: 3.6 CM
LEFT INTERNAL DIMENSION IN SYSTOLE: 2.9 CM (ref 2.1–4)
LEFT VENTRICULAR INTERNAL DIMENSION IN DIASTOLE: 4.1 CM (ref 3.5–6)
LEFT VENTRICULAR POSTERIOR WALL IN END DIASTOLE: 1.5 CM
LEFT VENTRICULAR STROKE VOLUME: 43 ML
LVSV (TEICH): 43 ML
MV E'TISSUE VEL-SEP: 12 CM/S
MV PEAK A VEL: 0.67 M/S
MV PEAK E VEL: 84 CM/S
RIGHT ATRIUM AREA SYSTOLE A4C: 13.5 CM2
RIGHT VENTRICLE ID DIMENSION: 3.1 CM
SL CV LEFT ATRIUM LENGTH A2C: 4.7 CM
SL CV PED ECHO LEFT VENTRICLE DIASTOLIC VOLUME (MOD BIPLANE) 2D: 75 ML
SL CV PED ECHO LEFT VENTRICLE SYSTOLIC VOLUME (MOD BIPLANE) 2D: 32 ML
TR MAX PG: 25 MMHG
TR PEAK VELOCITY: 2.5 M/S
TRICUSPID ANNULAR PLANE SYSTOLIC EXCURSION: 2.2 CM
TRICUSPID VALVE PEAK REGURGITATION VELOCITY: 2.5 M/S

## 2023-06-03 PROCEDURE — 93306 TTE W/DOPPLER COMPLETE: CPT

## 2023-06-05 LAB
AORTIC ROOT: 3.2 CM
APICAL FOUR CHAMBER EJECTION FRACTION: 62 %
ASCENDING AORTA: 3.2 CM
E WAVE DECELERATION TIME: 204 MS
FRACTIONAL SHORTENING: 29 % (ref 28–44)
INTERVENTRICULAR SEPTUM IN DIASTOLE (PARASTERNAL SHORT AXIS VIEW): 1.5 CM
INTERVENTRICULAR SEPTUM: 1.5 CM (ref 0.6–1.1)
LAAS-AP2: 14.9 CM2
LAAS-AP4: 12.4 CM2
LEFT ATRIUM SIZE: 3.6 CM
LEFT INTERNAL DIMENSION IN SYSTOLE: 2.9 CM (ref 2.1–4)
LEFT VENTRICULAR INTERNAL DIMENSION IN DIASTOLE: 4.1 CM (ref 3.5–6)
LEFT VENTRICULAR POSTERIOR WALL IN END DIASTOLE: 1.5 CM
LEFT VENTRICULAR STROKE VOLUME: 43 ML
LVSV (TEICH): 43 ML
MV E'TISSUE VEL-SEP: 12 CM/S
MV PEAK A VEL: 0.67 M/S
MV PEAK E VEL: 84 CM/S
RIGHT ATRIUM AREA SYSTOLE A4C: 13.5 CM2
RIGHT VENTRICLE ID DIMENSION: 3.1 CM
SL CV LEFT ATRIUM LENGTH A2C: 4.7 CM
SL CV LV EF: 65
SL CV PED ECHO LEFT VENTRICLE DIASTOLIC VOLUME (MOD BIPLANE) 2D: 75 ML
SL CV PED ECHO LEFT VENTRICLE SYSTOLIC VOLUME (MOD BIPLANE) 2D: 32 ML
TR MAX PG: 25 MMHG
TR PEAK VELOCITY: 2.5 M/S
TRICUSPID ANNULAR PLANE SYSTOLIC EXCURSION: 2.2 CM
TRICUSPID VALVE PEAK REGURGITATION VELOCITY: 2.5 M/S

## 2023-06-05 PROCEDURE — 93306 TTE W/DOPPLER COMPLETE: CPT | Performed by: INTERNAL MEDICINE

## 2023-06-05 RX ORDER — LOSARTAN POTASSIUM 50 MG/1
100 TABLET ORAL DAILY
Qty: 30 TABLET | Refills: 0
Start: 2023-06-05 | End: 2023-06-08 | Stop reason: SDUPTHER

## 2023-06-05 NOTE — TELEPHONE ENCOUNTER
Spoke with Augustine Moe  We reviewed his echocardiogram results which showed preserved LVEF with moderate LVH  He says BP is 366-777'U systolic during the day and 232 systolic prior to taking meds in the am   We will increase losartan to 100 mg daily  He will update our office on his blood pressures on Thursday of this week  We did discuss that if BP remains above goal we will consider a transition to irbesartan  He verbalized agreement

## 2023-06-08 RX ORDER — LOSARTAN POTASSIUM 100 MG/1
100 TABLET ORAL DAILY
Qty: 30 TABLET | Refills: 11 | Status: SHIPPED | OUTPATIENT
Start: 2023-06-08

## 2023-06-08 NOTE — ADDENDUM NOTE
Addended by: Douglas Hurley on: 6/8/2023 09:46 AM     Modules accepted: Orders Oral Minoxidil Counseling- I discussed with the patient the risks of oral minoxidil including but not limited to shortness of breath, swelling of the feet or ankles, dizziness, lightheadedness, unwanted hair growth and allergic reaction.  The patient verbalized understanding of the proper use and possible adverse effects of oral minoxidil.  All of the patient's questions and concerns were addressed.

## 2023-07-08 DIAGNOSIS — K21.9 GASTROESOPHAGEAL REFLUX DISEASE, UNSPECIFIED WHETHER ESOPHAGITIS PRESENT: ICD-10-CM

## 2023-07-08 DIAGNOSIS — I10 PRIMARY HYPERTENSION: ICD-10-CM

## 2023-07-08 DIAGNOSIS — R07.2 PRECORDIAL PAIN: ICD-10-CM

## 2023-07-10 RX ORDER — PANTOPRAZOLE SODIUM 40 MG/1
TABLET, DELAYED RELEASE ORAL
Qty: 90 TABLET | Refills: 0 | Status: SHIPPED | OUTPATIENT
Start: 2023-07-10

## 2023-07-10 RX ORDER — LOSARTAN POTASSIUM 100 MG/1
100 TABLET ORAL DAILY
Qty: 90 TABLET | Refills: 3 | Status: SHIPPED | OUTPATIENT
Start: 2023-07-10

## 2023-07-28 ENCOUNTER — APPOINTMENT (OUTPATIENT)
Dept: LAB | Facility: HOSPITAL | Age: 58
End: 2023-07-28
Payer: COMMERCIAL

## 2023-07-28 ENCOUNTER — HOSPITAL ENCOUNTER (OUTPATIENT)
Dept: NON INVASIVE DIAGNOSTICS | Facility: HOSPITAL | Age: 58
Discharge: HOME | End: 2023-07-28
Attending: INTERNAL MEDICINE
Payer: COMMERCIAL

## 2023-07-28 ENCOUNTER — TELEPHONE (OUTPATIENT)
Dept: FAMILY MEDICINE CLINIC | Facility: CLINIC | Age: 58
End: 2023-07-28

## 2023-07-28 VITALS — HEIGHT: 67 IN | WEIGHT: 178 LBS | BODY MASS INDEX: 27.94 KG/M2

## 2023-07-28 DIAGNOSIS — Z00.8 ENCOUNTER FOR OTHER GENERAL EXAMINATION: ICD-10-CM

## 2023-07-28 DIAGNOSIS — R07.2 PRECORDIAL PAIN: ICD-10-CM

## 2023-07-28 LAB
CHOLEST SERPL-MCNC: 298 MG/DL
EST. AVERAGE GLUCOSE BLD GHB EST-MCNC: 131 MG/DL
HBA1C MFR BLD: 6.2 %
HDLC SERPL-MCNC: 48 MG/DL
LDLC SERPL CALC-MCNC: 214 MG/DL (ref 0–100)
MAX HR PERCENT: 87 %
MAX HR: 142 BPM
NONHDLC SERPL-MCNC: 250 MG/DL
RATE PRESSURE PRODUCT: NORMAL
SL CV LV EF: 5559
SL CV STRESS RECOVERY BP: NORMAL MMHG
SL CV STRESS RECOVERY HR: 89 BPM
SL CV STRESS RECOVERY O2 SAT: 98 %
SL CV STRESS STAGE REACHED: 3
STRESS ANGINA INDEX: 1
STRESS BASELINE BP: NORMAL MMHG
STRESS BASELINE HR: 66 BPM
STRESS O2 SAT REST: 98 %
STRESS PEAK HR: 142 BPM
STRESS POST ESTIMATED WORKLOAD: 10.1 METS
STRESS POST EXERCISE DUR MIN: 9 MIN
STRESS POST EXERCISE DUR SEC: 0 SEC
STRESS POST O2 SAT PEAK: 100 %
STRESS POST PEAK BP: 210 MMHG
TRIGL SERPL-MCNC: 182 MG/DL

## 2023-07-28 PROCEDURE — 83036 HEMOGLOBIN GLYCOSYLATED A1C: CPT

## 2023-07-28 PROCEDURE — 80061 LIPID PANEL: CPT

## 2023-07-28 PROCEDURE — 93350 STRESS TTE ONLY: CPT

## 2023-07-28 PROCEDURE — 36415 COLL VENOUS BLD VENIPUNCTURE: CPT

## 2023-07-28 NOTE — TELEPHONE ENCOUNTER
----- Message from Jim Banks, [address] [facility] sent at 7/28/2023 11:48 AM EDT -----  Call pt.  Over due for annual physical  Review labs at that time

## 2023-07-31 ENCOUNTER — TELEPHONE (OUTPATIENT)
Dept: CARDIOLOGY CLINIC | Facility: CLINIC | Age: 58
End: 2023-07-31

## 2023-07-31 NOTE — TELEPHONE ENCOUNTER
----- Message from [address] sent at 7/28/2023  4:46 PM EDT -----  Please let Baird Burkitt know that his stress test did not show evidence of a blockage. His BP was elevated - how is this running at home? Also, there is no follow up scheduled, ok to schedule routine follow up visit in August  Thanks!

## 2023-08-03 NOTE — TELEPHONE ENCOUNTER
Spoke with Pt, He is aware of results. His Bps at the highest is running 151/80, 160/75. He couldn't remember the lows. He was driving. She did schedule a follow for September.

## 2023-09-08 ENCOUNTER — OFFICE VISIT (OUTPATIENT)
Dept: FAMILY MEDICINE CLINIC | Facility: CLINIC | Age: 58
End: 2023-09-08
Payer: COMMERCIAL

## 2023-09-08 VITALS
WEIGHT: 182 LBS | OXYGEN SATURATION: 96 % | BODY MASS INDEX: 28.56 KG/M2 | RESPIRATION RATE: 16 BRPM | HEIGHT: 67 IN | TEMPERATURE: 97.7 F | DIASTOLIC BLOOD PRESSURE: 90 MMHG | HEART RATE: 88 BPM | SYSTOLIC BLOOD PRESSURE: 160 MMHG

## 2023-09-08 DIAGNOSIS — Z11.59 NEED FOR HEPATITIS C SCREENING TEST: ICD-10-CM

## 2023-09-08 DIAGNOSIS — I10 PRIMARY HYPERTENSION: ICD-10-CM

## 2023-09-08 DIAGNOSIS — Z13.0 SCREENING, ANEMIA, DEFICIENCY, IRON: ICD-10-CM

## 2023-09-08 DIAGNOSIS — Z23 NEED FOR DIPHTHERIA-TETANUS-PERTUSSIS (TDAP) VACCINE: ICD-10-CM

## 2023-09-08 DIAGNOSIS — Z87.891 HISTORY OF TOBACCO ABUSE: ICD-10-CM

## 2023-09-08 DIAGNOSIS — K21.9 GASTROESOPHAGEAL REFLUX DISEASE WITHOUT ESOPHAGITIS: ICD-10-CM

## 2023-09-08 DIAGNOSIS — Z12.5 SCREENING FOR PROSTATE CANCER: ICD-10-CM

## 2023-09-08 DIAGNOSIS — Z00.00 ANNUAL PHYSICAL EXAM: Primary | ICD-10-CM

## 2023-09-08 DIAGNOSIS — Z13.29 SCREENING FOR THYROID DISORDER: ICD-10-CM

## 2023-09-08 DIAGNOSIS — E78.2 MIXED HYPERLIPIDEMIA: ICD-10-CM

## 2023-09-08 PROCEDURE — 99396 PREV VISIT EST AGE 40-64: CPT | Performed by: NURSE PRACTITIONER

## 2023-09-08 PROCEDURE — 90471 IMMUNIZATION ADMIN: CPT | Performed by: NURSE PRACTITIONER

## 2023-09-08 PROCEDURE — 90715 TDAP VACCINE 7 YRS/> IM: CPT | Performed by: NURSE PRACTITIONER

## 2023-09-08 RX ORDER — ATORVASTATIN CALCIUM 40 MG/1
40 TABLET, FILM COATED ORAL DAILY
Qty: 90 TABLET | Refills: 1 | Status: SHIPPED | OUTPATIENT
Start: 2023-09-08

## 2023-09-08 NOTE — ASSESSMENT & PLAN NOTE
Poor control  162/90 today  Reports home pressures are averaging 120-130/80s  Reports compliance with Losartan  Due for  cardiology follow up scheduled for 9/20  He will continue home checks -   Advise to take machine to cardiology appt to verify machine accuracy  Heart healthy diet recommendations attached to visit summary

## 2023-09-08 NOTE — PROGRESS NOTES
ADULT ANNUAL [facility]    NAME: Lillard Harada  AGE: 62 y.o. SEX: male  : 1965      DATE: 2023     Assessment and Plan:   Comprehensive physical exam  PMH and chronic conditions reviewed  Medications reconciled  Preventative care and recommended screenings as below   Lab work orders placed  Continue following with cardiology  Continue annual physicals  Follow-up sooner as needed  Problem List Items Addressed This Visit        Digestive    Gastroesophageal reflux disease       Cardiovascular and Mediastinum    Primary hypertension     Poor control  162/90 today  Reports home pressures are averaging 120-130/80s  Reports compliance with Losartan  Due for  cardiology follow up scheduled for   He will continue home checks -   Advise to take machine to cardiology appt to verify machine accuracy  Heart healthy diet recommendations attached to visit summary         Relevant Orders    Comprehensive metabolic panel       Other    Hyperlipidemia     Lipids still remain significantly elevated   Total 298;   Risks reviewed with patient again today. ASCVD risk: 29.2  Agreeable to trial statin.   Rx sent to pharmacy: Atorvastatin 40  Dose and potential side effects reviewed  Recheck lipid panel 3 months         Relevant Medications    atorvastatin (LIPITOR) 40 mg tablet    Other Relevant Orders    Lipid panel   Other Visit Diagnoses     Annual physical exam    -  Primary    History of tobacco abuse        Relevant Orders    CT lung screening program    Screening for thyroid disorder        Relevant Orders    TSH, 3rd generation with Free T4 reflex    Need for hepatitis C screening test        Relevant Orders    Hepatitis C antibody    Screening, anemia, deficiency, iron        Relevant Orders    CBC and differential    Screening for prostate cancer        Relevant Orders    PSA, Total Screen    Need for diphtheria-tetanus-pertussis (Tdap) vaccine        Relevant Orders    TDAP VACCINE GREATER THAN OR EQUAL TO 8YO IM (Completed)          Immunizations and preventive care screenings were discussed with patient today. Appropriate education was printed on patient's after visit summary. Discussed risks and benefits of prostate cancer screening. We discussed the controversial history of PSA screening for prostate cancer in the [facility] as well as the risk of over detection and over treatment of prostate cancer by way of PSA screening. The patient understands that PSA blood testing is an imperfect way to screen for prostate cancer and that elevated PSA levels in the blood may also be caused by infection, inflammation, prostatic trauma or manipulation, urological procedures, or by benign prostatic enlargement. The role of the digital rectal examination in prostate cancer screening was also discussed and I discussed with him that there is large interobserver variability in the findings of digital rectal examination. Counseling:  Alcohol/drug use: discussed moderation in alcohol intake, the recommendations for healthy alcohol use, and avoidance of illicit drug use. Dental Health: discussed importance of regular tooth brushing, flossing, and dental visits. Injury prevention: discussed safety/seat belts, safety helmets, smoke detectors, carbon dioxide detectors, and smoking near bedding or upholstery. Sexual health: discussed sexually transmitted diseases, partner selection, use of condoms, avoidance of unintended pregnancy, and contraceptive alternatives. · Exercise: the importance of regular exercise/physical activity was discussed. Recommend exercise 3-5 times per week for at least 30 minutes. BMI Counseling: Body mass index is 28.57 kg/m². The BMI is above normal. Nutrition recommendations include decreasing portion sizes, moderation in carbohydrate intake and reducing intake of saturated and trans fat.  Exercise recommendations include moderate physical activity 150 minutes/week. Rationale for BMI follow-up plan is due to patient being overweight or obese. Healthy lifestyle counseling  Pt reports very active: sports, hiking    Depression Screening and Follow-up Plan: Patient was screened for depression during today's encounter. They screened negative with a PHQ-2 score of 0. Lung Cancer Screening Shared Decision Making: I discussed with him that he is a candidate for lung cancer CT screening. The following Shared Decision-Making points were covered:  1. Benefits of screening were discussed, including the rates of reduction in death from lung cancer and other causes. Harms of screening were reviewed, including false positive tests, radiation exposure levels, risks of invasive procedures, risks of complications of screening, and risk of overdiagnosis. 2. I counseled on the importance of adherence to annual lung cancer LDCT screening, impact of co-morbidities, and ability or willingness to undergo diagnosis and treatment. 3. I counseled on the importance of maintaining abstinence as a former smoker or was counseled on the importance of smoking cessation if a current smoker    Review of Eligibility Criteria: He meets all of the criteria for Lung Cancer Screening.   - He is 62 y.o.   - He has 20 pack year tobacco history and is a current smoker or has quit within the past 15 years  - He presents no signs or symptoms of lung cancer    After discussion, the patient decided to elect lung cancer screening. Return in about 1 year (around 9/8/2024) for Annual physical.     Chief Complaint:     Chief Complaint   Patient presents with   • Physical Exam      History of Present Illness:     Adult Annual Physical   Patient here for a comprehensive physical exam. The patient reports no problems. Diet and Physical Activity  · Diet/Nutrition: well balanced diet. · Exercise: no formal exercise.       Depression Screening  PHQ-2/9 Depression Screening    Little interest or pleasure in doing things: 0 - not at all  Feeling down, depressed, or hopeless: 0 - not at all  PHQ-2 Score: 0  PHQ-2 Interpretation: Negative depression screen       General Health  · Sleep: sleeps well. · Hearing: normal - bilateral.  · Vision: no vision problems, most recent eye exam >1 year ago and Encouraged yearly eye exams: Acuity and general eye health check. · Dental: Encouraged yearly dental checks. · Colon screening due 2028       Health  · Symptoms include: none     Review of Systems:     Review of Systems   Constitutional: Negative. HENT: Negative. Eyes: Negative. Respiratory: Negative. Cardiovascular: Negative. Gastrointestinal: Negative. Genitourinary: Negative. Musculoskeletal: Negative. Skin: Negative. Neurological: Negative. Psychiatric/Behavioral: Negative.        Past Medical History:     Past Medical History:   Diagnosis Date   • Cardiomyopathy ([address])     Unclear details   • History of angioplasty     Unclear details   • HTN (hypertension)     diagnosed 2020   • Hyperlipidemia    • Rheumatic fever 1969   • Tobacco use       Past Surgical History:     Past Surgical History:   Procedure Laterality Date   • ANGIOPLASTY      age 36   • DENTAL SURGERY  04/2023   • KNEE ARTHROSCOPY Left     teenager      Family History:     Family History   Problem Relation Age of Onset   • Cancer Mother    • Alcohol abuse Father 62   • Hypertension Father    • Alcohol abuse Brother    • Drug abuse Brother    • Lung disease Maternal Grandmother    • Lung disease Maternal Grandfather    • Lung disease Paternal Grandfather       Social History:     Social History     Socioeconomic History   • Marital status: /Civil Union     Spouse name: Kevan Tarango   • Number of children: None   • Years of education: None   • Highest education level: None   Occupational History   • Occupation: Business owner   Tobacco Use   • Smoking status: Every Day Packs/day: 0.50     Years: 40.00     Total pack years: 20.00     Types: Cigarettes   • Smokeless tobacco: Never   • Tobacco comments:     Actively cutting back and trying to abstain. Vaping Use   • Vaping Use: Never used   Substance and Sexual Activity   • Alcohol use: Yes     Comment: twice monthly   • Drug use: Never   • Sexual activity: None     Comment: Defer   Other Topics Concern   • None   Social History Narrative   • None     Social Determinants of Health     Financial Resource Strain: Not on file   Food Insecurity: Not on file   Transportation Needs: Not on file   Physical Activity: Not on file   Stress: Not on file   Social Connections: Not on file   Intimate Partner Violence: Not on file   Housing Stability: Not on file      Current Medications:     Current Outpatient Medications   Medication Sig Dispense Refill   • atorvastatin (LIPITOR) 40 mg tablet Take 1 tablet (40 mg total) by mouth daily 90 tablet 1   • losartan (COZAAR) 100 MG tablet Take 1 tablet (100 mg total) by mouth daily 90 tablet 3   • pantoprazole (PROTONIX) 40 mg tablet TAKE ONE TABLET BY MOUTH EVERY DAY 90 tablet 0     No current facility-administered medications for this visit. Allergies:     No Known Allergies   Physical Exam:     /90 (BP Location: Left arm, Patient Position: Sitting, Cuff Size: Adult)   Pulse 88   Temp 97.7 °F (36.5 °C) (Temporal)   Resp 16   Ht 5' 6.93" (1.7 m)   Wt 82.6 kg (182 lb)   SpO2 96%   BMI 28.57 kg/m²     Physical Exam  Vitals and nursing note reviewed. Constitutional:       General: He is not in acute distress. Appearance: Normal appearance. He is well-developed and well-groomed. He is not ill-appearing. HENT:      Head: Normocephalic.       Right Ear: Tympanic membrane, ear canal and external ear normal.      Left Ear: Tympanic membrane, ear canal and external ear normal.      Nose: Nose normal.      Mouth/Throat:      Mouth: Mucous membranes are moist.      Pharynx: Oropharynx is clear. Eyes:      Conjunctiva/sclera: Conjunctivae normal.      Pupils: Pupils are equal, round, and reactive to light. Neck:      Thyroid: No thyromegaly. Vascular: No carotid bruit. Cardiovascular:      Rate and Rhythm: Normal rate and regular rhythm. Pulses:           Posterior tibial pulses are 2+ on the right side and 2+ on the left side. Heart sounds: Normal heart sounds. Pulmonary:      Effort: Pulmonary effort is normal. No respiratory distress. Breath sounds: Normal breath sounds and air entry. Abdominal:      General: Bowel sounds are normal.      Palpations: Abdomen is soft. Tenderness: There is no abdominal tenderness. Musculoskeletal:      Right lower leg: No edema. Left lower leg: No edema. Lymphadenopathy:      Cervical: No cervical adenopathy. Skin:     General: Skin is warm and dry. Comments: bruising left leg   Neurological:      General: No focal deficit present. Mental Status: He is alert and oriented to person, place, and time. Psychiatric:         Attention and Perception: Attention normal.         Mood and Affect: Mood normal.         Behavior: Behavior normal.         Thought Content:  Thought content normal.         Judgment: Judgment normal.          CARLA Flood  [address]

## 2023-09-18 NOTE — ASSESSMENT & PLAN NOTE
Lipids still remain significantly elevated   Total 298;   Risks reviewed with patient again today. ASCVD risk: 29.2  Agreeable to trial statin.   Rx sent to pharmacy: Atorvastatin 40  Dose and potential side effects reviewed  Recheck lipid panel 3 months

## 2023-10-04 ENCOUNTER — TELEPHONE (OUTPATIENT)
Dept: FAMILY MEDICINE CLINIC | Facility: CLINIC | Age: 58
End: 2023-10-04

## 2023-10-04 NOTE — TELEPHONE ENCOUNTER
My name is Jasmine Mccormack, [id number], birth date phone number is [phone]. I'm having some muscle spasms and back pain. Libia Stevens or food for is my doctor trying to see if I can get some muscle relaxing or something that you can give me because I don't want to mess with my heart medicine. I'm on escalating or no, I'm sorry, I'm on Losartan, 100 milligrams. Thank you.

## 2023-10-16 ENCOUNTER — TELEPHONE (OUTPATIENT)
Dept: FAMILY MEDICINE CLINIC | Facility: CLINIC | Age: 58
End: 2023-10-16

## 2023-10-16 NOTE — TELEPHONE ENCOUNTER
Voicemail from patient:    Yes, martin, my name is Dajuan Lui. My phone number is [phone]. I'm trying to call in because I have some hamstring issues and I would like for Alejandra AGUIRRE to be able to schedule me for an MRI and some other things so if I can, get a call back at [phone]. Greatly appreciate it. Thank you. Please call patient and schedule an appointment for an evaluation. This is a new concern and needs to be evaluated before we can order any testing.

## 2023-10-16 NOTE — TELEPHONE ENCOUNTER
Pt scheduled for 10/25/23 for an evaluation and to discuss PT. Pt added that he had concerns that it is a possible blood clot. Pt advised if he thought it was a blood clot to go to the ER.

## 2023-10-25 ENCOUNTER — OFFICE VISIT (OUTPATIENT)
Dept: FAMILY MEDICINE CLINIC | Facility: CLINIC | Age: 58
End: 2023-10-25
Payer: COMMERCIAL

## 2023-10-25 ENCOUNTER — APPOINTMENT (OUTPATIENT)
Dept: LAB | Facility: CLINIC | Age: 58
End: 2023-10-25
Payer: COMMERCIAL

## 2023-10-25 ENCOUNTER — EVALUATION (OUTPATIENT)
Dept: PHYSICAL THERAPY | Facility: CLINIC | Age: 58
End: 2023-10-25
Payer: COMMERCIAL

## 2023-10-25 VITALS
OXYGEN SATURATION: 98 % | HEART RATE: 72 BPM | HEIGHT: 67 IN | WEIGHT: 182.2 LBS | TEMPERATURE: 98 F | SYSTOLIC BLOOD PRESSURE: 160 MMHG | BODY MASS INDEX: 28.6 KG/M2 | DIASTOLIC BLOOD PRESSURE: 98 MMHG

## 2023-10-25 DIAGNOSIS — Z87.891 HISTORY OF TOBACCO ABUSE: ICD-10-CM

## 2023-10-25 DIAGNOSIS — M79.605 PAIN OF LEFT LOWER EXTREMITY: Primary | ICD-10-CM

## 2023-10-25 DIAGNOSIS — R07.2 PRECORDIAL PAIN: ICD-10-CM

## 2023-10-25 DIAGNOSIS — Z13.0 SCREENING, ANEMIA, DEFICIENCY, IRON: ICD-10-CM

## 2023-10-25 DIAGNOSIS — I10 PRIMARY HYPERTENSION: ICD-10-CM

## 2023-10-25 DIAGNOSIS — E78.2 MIXED HYPERLIPIDEMIA: ICD-10-CM

## 2023-10-25 DIAGNOSIS — R73.03 PREDIABETES: ICD-10-CM

## 2023-10-25 DIAGNOSIS — K21.9 GASTROESOPHAGEAL REFLUX DISEASE WITHOUT ESOPHAGITIS: ICD-10-CM

## 2023-10-25 LAB
ALBUMIN SERPL BCP-MCNC: 4.4 G/DL (ref 3.5–5)
ALP SERPL-CCNC: 74 U/L (ref 34–104)
ALT SERPL W P-5'-P-CCNC: 36 U/L (ref 7–52)
ANION GAP SERPL CALCULATED.3IONS-SCNC: 8 MMOL/L
AST SERPL W P-5'-P-CCNC: 28 U/L (ref 13–39)
BASOPHILS # BLD AUTO: 0.06 THOUSANDS/ÂΜL (ref 0–0.1)
BASOPHILS NFR BLD AUTO: 1 % (ref 0–1)
BILIRUB SERPL-MCNC: 0.56 MG/DL (ref 0.2–1)
BUN SERPL-MCNC: 14 MG/DL (ref 5–25)
CALCIUM SERPL-MCNC: 9.7 MG/DL (ref 8.4–10.2)
CHLORIDE SERPL-SCNC: 103 MMOL/L (ref 96–108)
CHOLEST SERPL-MCNC: 160 MG/DL
CO2 SERPL-SCNC: 30 MMOL/L (ref 21–32)
CREAT SERPL-MCNC: 0.91 MG/DL (ref 0.6–1.3)
EOSINOPHIL # BLD AUTO: 0.49 THOUSAND/ÂΜL (ref 0–0.61)
EOSINOPHIL NFR BLD AUTO: 5 % (ref 0–6)
ERYTHROCYTE [DISTWIDTH] IN BLOOD BY AUTOMATED COUNT: 14.1 % (ref 11.6–15.1)
EST. AVERAGE GLUCOSE BLD GHB EST-MCNC: 131 MG/DL
GFR SERPL CREATININE-BSD FRML MDRD: 93 ML/MIN/1.73SQ M
GLUCOSE P FAST SERPL-MCNC: 100 MG/DL (ref 65–99)
HBA1C MFR BLD: 6.2 %
HCT VFR BLD AUTO: 48 % (ref 36.5–49.3)
HDLC SERPL-MCNC: 52 MG/DL
HGB BLD-MCNC: 15.2 G/DL (ref 12–17)
IMM GRANULOCYTES # BLD AUTO: 0.04 THOUSAND/UL (ref 0–0.2)
IMM GRANULOCYTES NFR BLD AUTO: 0 % (ref 0–2)
LDLC SERPL CALC-MCNC: 82 MG/DL (ref 0–100)
LYMPHOCYTES # BLD AUTO: 3.87 THOUSANDS/ÂΜL (ref 0.6–4.47)
LYMPHOCYTES NFR BLD AUTO: 40 % (ref 14–44)
MCH RBC QN AUTO: 28.6 PG (ref 26.8–34.3)
MCHC RBC AUTO-ENTMCNC: 31.7 G/DL (ref 31.4–37.4)
MCV RBC AUTO: 90 FL (ref 82–98)
MONOCYTES # BLD AUTO: 0.96 THOUSAND/ÂΜL (ref 0.17–1.22)
MONOCYTES NFR BLD AUTO: 10 % (ref 4–12)
NEUTROPHILS # BLD AUTO: 4.32 THOUSANDS/ÂΜL (ref 1.85–7.62)
NEUTS SEG NFR BLD AUTO: 44 % (ref 43–75)
NONHDLC SERPL-MCNC: 108 MG/DL
NRBC BLD AUTO-RTO: 0 /100 WBCS
PLATELET # BLD AUTO: 306 THOUSANDS/UL (ref 149–390)
PMV BLD AUTO: 9.4 FL (ref 8.9–12.7)
POTASSIUM SERPL-SCNC: 4.8 MMOL/L (ref 3.5–5.3)
PROT SERPL-MCNC: 7.4 G/DL (ref 6.4–8.4)
RBC # BLD AUTO: 5.31 MILLION/UL (ref 3.88–5.62)
SODIUM SERPL-SCNC: 141 MMOL/L (ref 135–147)
TRIGL SERPL-MCNC: 132 MG/DL
WBC # BLD AUTO: 9.74 THOUSAND/UL (ref 4.31–10.16)

## 2023-10-25 PROCEDURE — 97161 PT EVAL LOW COMPLEX 20 MIN: CPT

## 2023-10-25 PROCEDURE — 85025 COMPLETE CBC W/AUTO DIFF WBC: CPT

## 2023-10-25 PROCEDURE — 80061 LIPID PANEL: CPT

## 2023-10-25 PROCEDURE — 80053 COMPREHEN METABOLIC PANEL: CPT

## 2023-10-25 PROCEDURE — 83036 HEMOGLOBIN GLYCOSYLATED A1C: CPT

## 2023-10-25 PROCEDURE — 99214 OFFICE O/P EST MOD 30 MIN: CPT | Performed by: NURSE PRACTITIONER

## 2023-10-25 PROCEDURE — 36415 COLL VENOUS BLD VENIPUNCTURE: CPT

## 2023-10-25 NOTE — ASSESSMENT & PLAN NOTE
Reports compliance with atorvastatin 40   Rating well-denies new myalgia  Reports he has significantly changed his diet  Mediterranean now  Will recheck lipid panel today  Will likely increase atorvastatin  Note ASCVD risk 29.2    Lipids in July:  Total 298;

## 2023-10-25 NOTE — ASSESSMENT & PLAN NOTE
Reports persistent left sided chest pain. Reports feeling "gassy", and pain is often reduced with belching. He is established with cardiology, and unfortunately missed his last appointment in September. Most recent imaging was an echo stress in July  We will evaluate further for potential GI etiology (see #2)  Patient was advised to call cardiology and schedule his follow-up today. We will check lab work today: CBC, CMP and lipid panel.   Although does not appear cardiac in nature, ER precautions with any acute changes

## 2023-10-25 NOTE — ASSESSMENT & PLAN NOTE
Reports chronic pain in the left posterior thigh.   No back pain  Sciatic first muscular  Referral today for PT evaluation

## 2023-10-25 NOTE — PROGRESS NOTES
[facility]    ASSESSMENT AND PLAN     1. Pain of left lower extremity  Assessment & Plan:  Reports chronic pain in the left posterior thigh. No back pain  Sciatic first muscular  Referral today for PT evaluation    Orders:  -     Ambulatory Referral to Physical Therapy; Future    2. Gastroesophageal reflux disease without esophagitis  Assessment & Plan:  Persistent/intermittent left chest wall precordial pain  Describes GERD symptoms  Admits to intermittent use only of pantoprazole  But does get good relief from it  Strongly encouraged GI evaluation  Patient now agreeable-referral placed    Orders:  -     CBC and differential; Future  -     Ambulatory Referral to Gastroenterology; Future    3. Primary hypertension  Assessment & Plan:  BP remains elevated  Continues to report normal home pressures  Reports compliant with losartan 100  Currently managed by cardiology  Note he missed his last appointment in September  Wrongly encouraged he call to schedule his follow-up today  Note still with intermittent left-sided precordial pain  Stress echo completed in July  May consider NM stress  Await cardiology follow-up  ER precautions for any acute change    Orders:  -     Comprehensive metabolic panel; Future    4. Mixed hyperlipidemia  Assessment & Plan:  Reports compliance with atorvastatin 40   Rating well-denies new myalgia  Reports he has significantly changed his diet  Mediterranean now  Will recheck lipid panel today  Will likely increase atorvastatin  Note ASCVD risk 29.2    Lipids in July: Total 298;       Orders:  -     Lipid panel; Future    5. Precordial pain  Assessment & Plan:  Reports persistent left sided chest pain. Reports feeling "gassy", and pain is often reduced with belching. He is established with cardiology, and unfortunately missed his last appointment in September.   Most recent imaging was an echo stress in July  We will evaluate further for potential GI etiology (see #2)  Patient was advised to call cardiology and schedule his follow-up today. We will check lab work today: CBC, CMP and lipid panel. Although does not appear cardiac in nature, ER precautions with any acute changes      6. History of tobacco abuse  Assessment & Plan:  Reports he remains tobacco free for the last several months    Orders:  -     Lipid panel; Future  -     HEMOGLOBIN A1C W/ EAG ESTIMATION; Future    7. Screening, anemia, deficiency, iron  -     CBC and differential; Future    8. Prediabetes  Assessment & Plan:  A1c was 6.2 in July  Hopeful for improvement with recent dietary change  Check A1c today    Orders:  -     Comprehensive metabolic panel; Future  -     HEMOGLOBIN A1C W/ EAG ESTIMATION; Future           SUBJECTIVE       Patient ID: Mandi Rubalcava is a 62 y.o. male. Chief Complaint   Patient presents with    Follow-up     Left hamstring since last month        HISTORY OF PRESENT ILLNESS    Presents today with left posterior leg pain. Has been on and off for months, but reports it has been worsening the last few weeks. Feels tight. Does improve with stretching. Inhibits him from his active lifestyle at times          The following portions of the patient's history were reviewed and updated as appropriate: allergies, current medications, past family history, past medical history, past social history, past surgical history, and problem list.    REVIEW OF SYSTEMS  Review of Systems   Constitutional: Negative. Negative for activity change. HENT: Negative. Respiratory: Negative. Cardiovascular:  Positive for chest pain (Left chest wall). Gastrointestinal:  Negative for abdominal pain, constipation and diarrhea. Chest fullness; gassy feeling   Genitourinary: Negative. Musculoskeletal:         Left posterior thigh pain   Neurological: Negative. Negative for dizziness and headaches. Psychiatric/Behavioral: Negative.          OBJECTIVE      VITAL SIGNS  /98 (BP Location: Left arm, Patient Position: Standing, Cuff Size: Adult)   Pulse 72   Temp 98 °F (36.7 °C)   Ht 5' 7" (1.702 m)   Wt 82.6 kg (182 lb 3.2 oz)   SpO2 98%   BMI 28.54 kg/m²     CURRENT MEDICATIONS    Current Outpatient Medications:     atorvastatin (LIPITOR) 40 mg tablet, Take 1 tablet (40 mg total) by mouth daily, Disp: 90 tablet, Rfl: 1    losartan (COZAAR) 100 MG tablet, Take 1 tablet (100 mg total) by mouth daily, Disp: 90 tablet, Rfl: 3    pantoprazole (PROTONIX) 40 mg tablet, TAKE ONE TABLET BY MOUTH EVERY DAY, Disp: 90 tablet, Rfl: 0      PHYSICAL EXAMINATION   Physical Exam  Vitals and nursing note reviewed. Constitutional:       General: He is not in acute distress. Appearance: Normal appearance. He is not ill-appearing. HENT:      Head: Normocephalic. Neck:      Vascular: No carotid bruit. Cardiovascular:      Rate and Rhythm: Normal rate and regular rhythm. Heart sounds: Normal heart sounds. Pulmonary:      Effort: Pulmonary effort is normal. No respiratory distress. Breath sounds: Normal breath sounds and air entry. Musculoskeletal:      Right lower leg: No edema. Left lower leg: No edema. Skin:     General: Skin is warm and dry. Neurological:      General: No focal deficit present. Mental Status: He is alert and oriented to person, place, and time.    Psychiatric:         Attention and Perception: Attention normal.         Mood and Affect: Mood normal.         Behavior: Behavior normal.

## 2023-10-25 NOTE — PROGRESS NOTES
PT Evaluation     Today's date: 10/25/2023  Patient name: Pat Marie  : 1965  MRN: [id number]  Referring provider: CARLA Mcwilliams  Dx:   Encounter Diagnosis     ICD-10-CM    1. Pain of left lower extremity  M79.605 Ambulatory Referral to Physical Therapy                     Assessment  Assessment details: Pt is a 62y.o. year old male that presents to outpatient physical therapy with left lower extremity pain. Pt exhibits signs and symptoms that point to L hamstring and hip adductor involvement. Pt demonstrates pain with palpation to the medial knee flexors and hip adductors as well as decrease strength and tolerance with MMT to noted musculature. Pt demonstrates increased pain, decreased ROM, decreased strength, decreased activity tolerance, and decreased functional activity such was walking, sitting, and work duties due to pain. Pt appears motivated; HEP was reviewed and given to patient. Pt would benefit from skilled physical therapy to address noted impairments, meet patient's goals, and to return to OF. Thank you for this referral.    Impairments: abnormal gait, abnormal muscle firing, abnormal or restricted ROM, activity intolerance, impaired physical strength, lacks appropriate home exercise program and pain with function    Symptom irritability: lowUnderstanding of Dx/Px/POC: good   Prognosis: fair    Goals  STGs:   1. Pt will be able to demonstrate an increase of strength by at least 1/2 grade within 4 weeks   2. Pt will be able to achieve increased ROM by at least 25% within 4 weeks. 3. Pt will be able to report pain less than 4/10 at worse within 4 weeks. 4. Pt will be able to demonstrate improved hip adductor strength to at least 4/5 MMT within 4 weeks   5. Pt will be able to improve hamstring length on the L by at least 25 degrees within 4 weeks. 6. Pt will be able to performed bed mobility without increase of symptoms within 4 weeks. LTGs:   1.  Pt will be independent with all IADLs without pain or discomfort upon discharge. 2. Pt will be independent with HEP upon discharge   3. Pt will be able to report no pain or discomfort with all work duties and recreational activities for a full day upon discharge. 4. Pt will be able to report 'no difficulty' with heavy household activities such as cleaning or lifting at least 10 lbs to waist upon discharge         Plan  Patient would benefit from: PT eval and skilled physical therapy  Planned modality interventions: cryotherapy, electrical stimulation/Russian stimulation, TENS, low level laser therapy, thermotherapy: hydrocollator packs, ultrasound and unattended electrical stimulation  Planned therapy interventions: abdominal trunk stabilization, IADL retraining, joint mobilization, manual therapy, massage, muscle pump exercises, neuromuscular re-education, patient education, strengthening, stretching, therapeutic activities, therapeutic exercise, therapeutic training, home exercise program, gait training, functional ROM exercises, flexibility, balance, body mechanics training and breathing training  Frequency: 2x week  Duration in visits: 16  Duration in weeks: 8  Treatment plan discussed with: patient        Subjective Evaluation    History of Present Illness  Mechanism of injury: Pt states that he has been have pain/discomfort for the past few months. Reports that he was water skiing and his leg went out to the side and got stretched. Following the mechanism, he states that he had a lot of upper thigh and groin bruising and has not been able to sit on his R back leg due to pain. States that his leg is very tight. His work requires him to side and drive a lot. Denies changes in bowel/bladder. Denies saddle anesthesia. Denies numbness/tingling down the leg.  Does has       AGGS: stretching hamstrings, stepping up stairs, getting in/out of the truck, walking, standing, sitting  EASES: rest, light stretches  Goals: return to work without pain, be able to go hiking without pain, get back to his regular, activity lifestyle  Patient Goals  Patient goals for therapy: decreased pain, increased motion, increased strength, independence with ADLs/IADLs, return to sport/leisure activities and return to work    Pain  Current pain ratin  At best pain ratin  At worst pain ratin  Quality: sharp, tight, cramping and discomfort          Objective     Palpation   Left   Tenderness of the adductor brevis, adductor longus, adductor luanne, proximal biceps femoris, proximal semimembranosus and proximal semitendinosus. Trigger point to adductor brevis, adductor longus, adductor luanne, proximal semimembranosus and proximal semitendinosus. Tenderness     Left Hip   Tenderness in the ischial tuberosity. Neurological Testing     Sensation     Lumbar   Left   Intact: light touch    Right   Intact: light touch    Passive Range of Motion   Left Hip   Flexion: WFL  Adduction: 13 degrees with pain    Right Hip   Adduction: [facility]    Joint Play     Hypomobile: L2, L3, L4, L5 and S1     Strength/Myotome Testing     Left Hip   Planes of Motion   Flexion: 3+ (with pain to posterior thigh)  Extension: 3 (with severe pain to posterior thigh)  Abduction: 3+ (with pain to posterior thigh)  Adduction: 3 (with severe pain to medial thigh)    Right Hip   Planes of Motion   Flexion: 4+  Abduction: 4+    Left Knee   Left knee flexion strength: unable to test due to severe pain. Extension: 4+    Right Knee   Flexion: 4+  Extension: 4+    Tests     Left Hip   Positive Ely's. Negative SUELLEN and FADIR. Right Hip   Positive Ely's. Additional Tests Details  Hamstring length in 90/90 supine:     - Right side = lacking 18%     - Left side = lacking 56%      General Comments:      Hip Comments   Gait: Pt ambulates with decreased stride length on the R with little to knee extension or flexion on the L.  An antalgic gait pattern              Precautions: none    Daily Treatment Diary    Date 10/25            FOTO IE -             Re-Eval IE               Manuals    HS STM              Hip PROM                                       Neuro Re-Ed     Glut set 10x            Glut set with hip extension 10x            Seated knee flexion ISO 3" hold 10x                                                                Ther Ex    Bike - ROM             bridge             Supine HS stretch with TKE             Seated HS with strap             Standing knee flexion                                                                                            Ther Activity                              Gait Training                              Modalities

## 2023-10-25 NOTE — ASSESSMENT & PLAN NOTE
BP remains elevated  Continues to report normal home pressures  Reports compliant with losartan 100  Currently managed by cardiology  Note he missed his last appointment in September  Wrongly encouraged he call to schedule his follow-up today  Note still with intermittent left-sided precordial pain  Stress echo completed in July  May consider NM stress  Await cardiology follow-up  ER precautions for any acute change

## 2023-10-25 NOTE — ASSESSMENT & PLAN NOTE
Persistent/intermittent left chest wall precordial pain  Describes GERD symptoms  Admits to intermittent use only of pantoprazole  But does get good relief from it  Strongly encouraged GI evaluation  Patient now agreeable-referral placed

## 2023-10-31 DIAGNOSIS — E78.2 MIXED HYPERLIPIDEMIA: Primary | ICD-10-CM

## 2023-11-01 ENCOUNTER — OFFICE VISIT (OUTPATIENT)
Dept: PHYSICAL THERAPY | Facility: CLINIC | Age: 58
End: 2023-11-01
Payer: COMMERCIAL

## 2023-11-01 DIAGNOSIS — M79.605 PAIN OF LEFT LOWER EXTREMITY: Primary | ICD-10-CM

## 2023-11-01 PROCEDURE — 97140 MANUAL THERAPY 1/> REGIONS: CPT

## 2023-11-01 PROCEDURE — 97110 THERAPEUTIC EXERCISES: CPT

## 2023-11-01 NOTE — PROGRESS NOTES
Daily Note     Today's date: 2023  Patient name: Karli Buchanan  : 1965  MRN: [id number]  Referring provider: CARLA Flood  Dx:   Encounter Diagnosis     ICD-10-CM    1. Pain of left lower extremity  M79.605                      Subjective: Pt states that he has been performing the HEP daily and feels that his leg is improving. Has been able to go on hikes for a few miles, but started having severe pain by the end      Objective: See treatment diary below      Assessment: Tolerated treatment well. Manual techniques were performed to decrease pain, improve ROM, and to increase muscular mobility. Activities were also performed to increase lumbopelvic mobility and increase hamstring and hip adductor strength within tolerance. Following today's session, Pt stated that he felt more stretched out and felt better overall. Updated HEP as noted below. Education was given on DOMS following increase of activity. Patient demonstrated fatigue post treatment and would benefit from continued PT      Plan: Continue per plan of care.       Precautions: none    Daily Treatment Diary    Date 10/25 11/1           FOTO IE -             Re-Eval IE               Manuals    HS STM   With stick - JM            Hip PROM  JM                                     Neuro Re-Ed     Glut set 10x 10x           Glut set with hip extension 10x 10x           Seated knee flexion ISO 3" hold 10x HEP                                                               Ther Ex    Bike - ROM  4 mins L3           bridge  2x10           Supine HS stretch with TKE  HEP           Seated HS with strap  HEP           Standing knee flexion   NV           Supine hip adductor ISO  2x10           Supine bent knee fallouts  10x holding 3"           HS curls at machine  15x (B) 5x single   35#    15x (B) 5x single   65#           Double leg RDLs  10x 10#                                                               Ther Activity Gait Training                              Modalities

## 2023-11-04 DIAGNOSIS — R07.2 PRECORDIAL PAIN: ICD-10-CM

## 2023-11-04 DIAGNOSIS — K21.9 GASTROESOPHAGEAL REFLUX DISEASE, UNSPECIFIED WHETHER ESOPHAGITIS PRESENT: ICD-10-CM

## 2023-11-05 RX ORDER — PANTOPRAZOLE SODIUM 40 MG/1
TABLET, DELAYED RELEASE ORAL
Qty: 90 TABLET | Refills: 0 | Status: SHIPPED | OUTPATIENT
Start: 2023-11-05

## 2023-11-28 ENCOUNTER — APPOINTMENT (EMERGENCY)
Dept: RADIOLOGY | Facility: HOSPITAL | Age: 58
End: 2023-11-28
Payer: COMMERCIAL

## 2023-11-28 ENCOUNTER — HOSPITAL ENCOUNTER (EMERGENCY)
Facility: HOSPITAL | Age: 58
Discharge: HOME | End: 2023-11-28
Attending: EMERGENCY MEDICINE | Admitting: EMERGENCY MEDICINE
Payer: COMMERCIAL

## 2023-11-28 VITALS
HEART RATE: 66 BPM | BODY MASS INDEX: 28.86 KG/M2 | OXYGEN SATURATION: 98 % | RESPIRATION RATE: 16 BRPM | HEIGHT: 67 IN | WEIGHT: 183.86 LBS | TEMPERATURE: 96.8 F | SYSTOLIC BLOOD PRESSURE: 172 MMHG | DIASTOLIC BLOOD PRESSURE: 89 MMHG

## 2023-11-28 DIAGNOSIS — R07.9 CHEST PAIN, UNSPECIFIED TYPE: Primary | ICD-10-CM

## 2023-11-28 DIAGNOSIS — I10 ACCELERATED HYPERTENSION: Primary | ICD-10-CM

## 2023-11-28 LAB
ALBUMIN SERPL BCP-MCNC: 4.3 G/DL (ref 3.5–5)
ALP SERPL-CCNC: 74 U/L (ref 34–104)
ALT SERPL W P-5'-P-CCNC: 28 U/L (ref 7–52)
ANION GAP SERPL CALCULATED.3IONS-SCNC: 4 MMOL/L
AST SERPL W P-5'-P-CCNC: 22 U/L (ref 13–39)
ATRIAL RATE: 71 BPM
BASOPHILS # BLD AUTO: 0.06 THOUSANDS/ÂΜL (ref 0–0.1)
BASOPHILS NFR BLD AUTO: 1 % (ref 0–1)
BILIRUB SERPL-MCNC: 0.44 MG/DL (ref 0.2–1)
BUN SERPL-MCNC: 14 MG/DL (ref 5–25)
CALCIUM SERPL-MCNC: 9.3 MG/DL (ref 8.4–10.2)
CARDIAC TROPONIN I PNL SERPL HS: 3 NG/L
CHLORIDE SERPL-SCNC: 104 MMOL/L (ref 96–108)
CO2 SERPL-SCNC: 30 MMOL/L (ref 21–32)
CREAT SERPL-MCNC: 1.04 MG/DL (ref 0.6–1.3)
EOSINOPHIL # BLD AUTO: 0.44 THOUSAND/ÂΜL (ref 0–0.61)
EOSINOPHIL NFR BLD AUTO: 4 % (ref 0–6)
ERYTHROCYTE [DISTWIDTH] IN BLOOD BY AUTOMATED COUNT: 13.5 % (ref 11.6–15.1)
GFR SERPL CREATININE-BSD FRML MDRD: 79 ML/MIN/1.73SQ M
GLUCOSE SERPL-MCNC: 119 MG/DL (ref 65–140)
HCT VFR BLD AUTO: 46.3 % (ref 36.5–49.3)
HGB BLD-MCNC: 15.4 G/DL (ref 12–17)
IMM GRANULOCYTES # BLD AUTO: 0.03 THOUSAND/UL (ref 0–0.2)
IMM GRANULOCYTES NFR BLD AUTO: 0 % (ref 0–2)
LYMPHOCYTES # BLD AUTO: 4.43 THOUSANDS/ÂΜL (ref 0.6–4.47)
LYMPHOCYTES NFR BLD AUTO: 41 % (ref 14–44)
MCH RBC QN AUTO: 29 PG (ref 26.8–34.3)
MCHC RBC AUTO-ENTMCNC: 33.3 G/DL (ref 31.4–37.4)
MCV RBC AUTO: 87 FL (ref 82–98)
MONOCYTES # BLD AUTO: 1.12 THOUSAND/ÂΜL (ref 0.17–1.22)
MONOCYTES NFR BLD AUTO: 10 % (ref 4–12)
NEUTROPHILS # BLD AUTO: 4.87 THOUSANDS/ÂΜL (ref 1.85–7.62)
NEUTS SEG NFR BLD AUTO: 44 % (ref 43–75)
NRBC BLD AUTO-RTO: 0 /100 WBCS
P AXIS: 72 DEGREES
PLATELET # BLD AUTO: 290 THOUSANDS/UL (ref 149–390)
PMV BLD AUTO: 8.5 FL (ref 8.9–12.7)
POTASSIUM SERPL-SCNC: 3.9 MMOL/L (ref 3.5–5.3)
PR INTERVAL: 174 MS
PROT SERPL-MCNC: 6.9 G/DL (ref 6.4–8.4)
QRS AXIS: 78 DEGREES
QRSD INTERVAL: 102 MS
QT INTERVAL: 388 MS
QTC INTERVAL: 421 MS
RBC # BLD AUTO: 5.31 MILLION/UL (ref 3.88–5.62)
SODIUM SERPL-SCNC: 138 MMOL/L (ref 135–147)
T WAVE AXIS: 70 DEGREES
VENTRICULAR RATE: 71 BPM
WBC # BLD AUTO: 10.95 THOUSAND/UL (ref 4.31–10.16)

## 2023-11-28 PROCEDURE — 36415 COLL VENOUS BLD VENIPUNCTURE: CPT | Performed by: EMERGENCY MEDICINE

## 2023-11-28 PROCEDURE — 99284 EMERGENCY DEPT VISIT MOD MDM: CPT | Performed by: EMERGENCY MEDICINE

## 2023-11-28 PROCEDURE — 71045 X-RAY EXAM CHEST 1 VIEW: CPT

## 2023-11-28 PROCEDURE — 80053 COMPREHEN METABOLIC PANEL: CPT | Performed by: EMERGENCY MEDICINE

## 2023-11-28 PROCEDURE — 85025 COMPLETE CBC W/AUTO DIFF WBC: CPT | Performed by: EMERGENCY MEDICINE

## 2023-11-28 PROCEDURE — 93005 ELECTROCARDIOGRAM TRACING: CPT

## 2023-11-28 PROCEDURE — 84484 ASSAY OF TROPONIN QUANT: CPT | Performed by: EMERGENCY MEDICINE

## 2023-11-28 PROCEDURE — 99285 EMERGENCY DEPT VISIT HI MDM: CPT

## 2023-11-28 RX ORDER — AMLODIPINE BESYLATE 2.5 MG/1
2.5 TABLET ORAL DAILY
Qty: 30 TABLET | Refills: 11 | Status: SHIPPED | OUTPATIENT
Start: 2023-11-28

## 2023-11-28 RX ORDER — DIAZEPAM 5 MG/1
5 TABLET ORAL ONCE
Status: DISCONTINUED | OUTPATIENT
Start: 2023-11-28 | End: 2023-11-28 | Stop reason: HOSPADM

## 2023-11-28 NOTE — TELEPHONE ENCOUNTER
Spoke with Spouse. They have been checking his BP at home. It is averaging 180/100. It is not going down. He has pressure across his chest. No other places. No headache. No dizziness. No other symptoms.

## 2023-11-28 NOTE — TELEPHONE ENCOUNTER
Prescription sent. I called Pt to schedule. NO answer. Mailbox full. I did schedule in an open slot at 12/13/23 @ 9:00am. We can move if this doesn't work for Pt.
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
## 2023-11-28 NOTE — TELEPHONE ENCOUNTER
Patients spouse called and stated patient was in the ER last night for high BP and chest pain his BP is still at 180/100 and they cannot get it to go lower    Spouse can be reached at [phone]

## 2023-11-28 NOTE — ED PROVIDER NOTES
History  Chief Complaint   Patient presents with    Chest Pain     Pt woke 2 hrs PTA with squeezing CP that was intermittent yesterday but worse today. 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  Taking?   atorvastatin (LIPITOR) 40 mg tablet   No No   Sig: Take 1 tablet (40 mg total) by mouth daily   losartan (COZAAR) 100 MG tablet   No No   Sig: Take 1 tablet (100 mg total) by mouth daily   pantoprazole (PROTONIX) 40 mg tablet   No No   Sig: TAKE ONE TABLET BY MOUTH EVERY DAY      Facility-Administered Medications: None       Past Medical History:   Diagnosis Date    Cardiomyopathy ([address])     Unclear details    History of angioplasty     Unclear details    HTN (hypertension)     diagnosed 2020    Hyperlipidemia     Rheumatic fever 1969    Tobacco use        Past Surgical History:   Procedure Laterality Date    ANGIOPLASTY      age 36    DENTAL SURGERY  04/2023    KNEE ARTHROSCOPY Left     teenager       Family History   Problem Relation Age of Onset    Cancer Mother     Alcohol abuse Father 62    Hypertension Father     Alcohol abuse Brother     Drug abuse Brother     Lung disease Maternal Grandmother     Lung disease Maternal Grandfather     Lung disease Paternal Grandfather      I have reviewed and agree with the history as documented. E-Cigarette/Vaping    E-Cigarette Use Never User      E-Cigarette/Vaping Substances    Nicotine No     THC No     CBD No     Flavoring No     Other No     Unknown No      Social History     Tobacco Use    Smoking status: Former     Packs/day: 0.50     Years: 40.00     Total pack years: 20.00     Types: Cigarettes     Quit date: 8/15/2023     Years since quittin.2    Smokeless tobacco: Never    Tobacco comments:     Actively cutting back and trying to abstain. Vaping Use    Vaping Use: Never used   Substance Use Topics    Alcohol use: Yes     Comment: twice monthly    Drug use: Never       Review of Systems   Constitutional:  Negative for appetite change, chills, fatigue and fever. HENT:  Negative for ear pain, rhinorrhea, sore throat and trouble swallowing. Eyes:  Negative for pain, discharge and visual disturbance. Respiratory:  Negative for cough, chest tightness and shortness of breath. Cardiovascular:  Positive for chest pain. Negative for palpitations. Gastrointestinal:  Negative for abdominal pain, nausea and vomiting. Endocrine: Negative for polydipsia, polyphagia and polyuria. Genitourinary:  Negative for difficulty urinating, dysuria, hematuria and testicular pain. Musculoskeletal:  Negative for arthralgias and back pain. Skin:  Negative for color change and rash. Allergic/Immunologic: Negative for immunocompromised state. Neurological:  Negative for dizziness, seizures, syncope, weakness and headaches. Hematological:  Negative for adenopathy. Psychiatric/Behavioral:  Negative for confusion and dysphoric mood.     All other systems reviewed and are negative. Physical Exam  Physical Exam  Vitals and nursing note reviewed. Constitutional:       General: He is not in acute distress. Appearance: Normal appearance. He is not ill-appearing, toxic-appearing or diaphoretic. HENT:      Head: Normocephalic and atraumatic. Nose: Nose normal. No congestion or rhinorrhea. Mouth/Throat:      Mouth: Mucous membranes are moist.      Pharynx: Oropharynx is clear. No oropharyngeal exudate or posterior oropharyngeal erythema. Eyes:      General:         Right eye: No discharge. Left eye: No discharge. Cardiovascular:      Rate and Rhythm: Normal rate and regular rhythm. Pulses: Normal pulses. Heart sounds: Normal heart sounds. No murmur heard. No gallop. Pulmonary:      Effort: Pulmonary effort is normal. No respiratory distress. Breath sounds: Normal breath sounds. No stridor. No wheezing, rhonchi or rales. Chest:      Chest wall: No tenderness. Abdominal:      General: Bowel sounds are normal. There is no distension. Palpations: Abdomen is soft. There is no mass. Tenderness: There is no abdominal tenderness. There is no right CVA tenderness, left CVA tenderness, guarding or rebound. Hernia: No hernia is present. Musculoskeletal:         General: Normal range of motion. Cervical back: Normal range of motion and neck supple. Skin:     General: Skin is warm and dry. Capillary Refill: Capillary refill takes less than 2 seconds. Neurological:      General: No focal deficit present. Mental Status: He is alert and oriented to person, place, and time. Cranial Nerves: No cranial nerve deficit. Sensory: No sensory deficit. Motor: No weakness. Coordination: Coordination normal.      Gait: Gait normal.      Deep Tendon Reflexes: Reflexes normal.   Psychiatric:         Behavior: Behavior normal.         Thought Content:  Thought content normal.         Judgment: Judgment normal.      Comments: Anxious appearing         Vital Signs  ED Triage Vitals   Temperature Pulse Respirations Blood Pressure SpO2   11/28/23 0009 11/28/23 0008 11/28/23 0008 11/28/23 0008 11/28/23 0008   (!) 96.8 °F (36 °C) 71 18 (!) 209/93 99 %      Temp Source Heart Rate Source Patient Position - Orthostatic VS BP Location FiO2 (%)   11/28/23 0008 11/28/23 0008 11/28/23 0008 11/28/23 0008 --   Temporal Monitor Lying Left arm       Pain Score       11/28/23 0008       2           Vitals:    11/28/23 0029 11/28/23 0030 11/28/23 0045 11/28/23 0056   BP: (!) 172/85 (!) 199/123 (!) 172/89 (!) 172/89   Pulse: 65 66 64 66   Patient Position - Orthostatic VS: Lying Lying Lying Lying         Visual Acuity      ED Medications  Medications   diazepam (VALIUM) tablet 5 mg (5 mg Oral Not Given 11/28/23 0033)       Diagnostic Studies  Results Reviewed       Procedure Component Value Units Date/Time    HS Troponin 0hr (reflex protocol) [[id number]]  (Normal) Collected: 11/28/23 0019    Lab Status: Final result Specimen: Blood from Arm, Right Updated: 11/28/23 0050     hs TnI 0hr 3 ng/L     Comprehensive metabolic panel [[id number]] Collected: 11/28/23 0019    Lab Status: Final result Specimen: Blood from Arm, Right Updated: 11/28/23 0047     Sodium 138 mmol/L      Potassium 3.9 mmol/L      Chloride 104 mmol/L      CO2 30 mmol/L      ANION GAP 4 mmol/L      BUN 14 mg/dL      Creatinine 1.04 mg/dL      Glucose 119 mg/dL      Calcium 9.3 mg/dL      AST 22 U/L      ALT 28 U/L      Alkaline Phosphatase 74 U/L      Total Protein 6.9 g/dL      Albumin 4.3 g/dL      Total Bilirubin 0.44 mg/dL      eGFR 79 ml/min/1.73sq m     Narrative:      [facility] guidelines for Chronic Kidney Disease (CKD):     Stage 1 with normal or high GFR (GFR > 90 mL/min/1.73 square meters)    Stage 2 Mild CKD (GFR = 60-89 mL/min/1.73 square meters)    Stage 3A Moderate CKD (GFR = 45-59 mL/min/1.73 square meters)    Stage 3B Moderate CKD (GFR = 30-44 mL/min/1.73 square meters)    Stage 4 Severe CKD (GFR = 15-29 mL/min/1.73 square meters)    Stage 5 End Stage CKD (GFR <15 mL/min/1.73 square meters)  Note: GFR calculation is accurate only with a steady state creatinine    CBC and differential [[id number]]  (Abnormal) Collected: 11/28/23 0019    Lab Status: Final result Specimen: Blood from Arm, Right Updated: 11/28/23 0028     WBC 10.95 Thousand/uL      RBC 5.31 Million/uL      Hemoglobin 15.4 g/dL      Hematocrit 46.3 %      MCV 87 fL      MCH 29.0 pg      MCHC 33.3 g/dL      RDW 13.5 %      MPV 8.5 fL      Platelets 339 Thousands/uL      nRBC 0 /100 WBCs      Neutrophils Relative 44 %      Immat GRANS % 0 %      Lymphocytes Relative 41 %      Monocytes Relative 10 %      Eosinophils Relative 4 %      Basophils Relative 1 %      Neutrophils Absolute 4.87 Thousands/µL      Immature Grans Absolute 0.03 Thousand/uL      Lymphocytes Absolute 4.43 Thousands/µL      Monocytes Absolute 1.12 Thousand/µL      Eosinophils Absolute 0.44 Thousand/µL      Basophils Absolute 0.06 Thousands/µL                    XR chest 1 view portable    (Results Pending)              Procedures  Procedures         ED Course             HEART Risk Score      Flowsheet Row Most Recent Value   Heart Score Risk Calculator    History 0 Filed at: 11/28/2023 0052   ECG 0 Filed at: 11/28/2023 0052   Age 1 Filed at: 11/28/2023 0052   Risk Factors 1 Filed at: 11/28/2023 0052   Troponin 0 Filed at: 11/28/2023 0052   HEART Score 2 Filed at: 11/28/2023 6860                          SBIRT 20yo+      Flowsheet Row Most Recent Value   Initial Alcohol Screen: US AUDIT-C     1. How often do you have a drink containing alcohol? 0 Filed at: 11/28/2023 0020   2. How many drinks containing alcohol do you have on a typical day you are drinking? 0 Filed at: 11/28/2023 0020   3a. Male UNDER 65: How often do you have five or more drinks on one occasion?  0 Filed at: 11/28/2023 0020   Audit-C Score 0 Filed at: 11/28/2023 0020   DON: How many times in the past year have you. .. Used an illegal drug or used a prescription medication for non-medical reasons? Never Filed at: 11/28/2023 0020                      Medical Decision Making  0005: Patient appears anxious, vital signs reviewed. EKG nonischemic. Placed on the monitor. Plan to complete basic labs including cardiac enzymes. Check chest x-ray. No DVT/PE stigmata or symptomatology. 0100: Chest x-ray and labs reviewed. Patient has remained stable throughout ED course. Stable for discharge. Amount and/or Complexity of Data Reviewed  Labs: ordered. Radiology: ordered and independent interpretation performed. Details: Chest x-ray no acute pathology  ECG/medicine tests: ordered and independent interpretation performed. Details: Normal sinus rhythm without acute ischemia    Risk  Prescription drug management. Disposition  Final diagnoses:   Chest pain, unspecified type     Time reflects when diagnosis was documented in both MDM as applicable and the Disposition within this note       Time User Action Codes Description Comment    11/28/2023 12:52 AM Deandre Wheat Add [R07.9] Chest pain, unspecified type           ED Disposition       ED Disposition   Discharge    Condition   Stable    Date/Time   Tue Nov 28, 2023 [facility] discharge to home/self care.                    Follow-up Information       Follow up With Specialties Details Why Contact Info    [facility] Schedule an appointment as soon as possible for a visit   [address] [place] [address] And [address] [place] [address]  [phone]              Discharge Medication List as of 11/28/2023 12:52 AM        CONTINUE these medications which have NOT CHANGED    Details   atorvastatin (LIPITOR) 40 mg tablet Take 1 tablet (40 mg total) by mouth daily, Starting Fri 9/8/2023, Normal      losartan (COZAAR) 100 MG tablet Take 1 tablet (100 mg total) by mouth daily, Starting Mon 7/10/2023, Normal      pantoprazole (PROTONIX) 40 mg tablet TAKE ONE TABLET BY MOUTH EVERY DAY, Normal             No discharge procedures on file.     PDMP Review       None            ED Provider  Electronically Signed by             Roseanna Reid MD  11/28/23 9053

## 2023-11-29 ENCOUNTER — TELEPHONE (OUTPATIENT)
Dept: FAMILY MEDICINE CLINIC | Facility: CLINIC | Age: 58
End: 2023-11-29

## 2023-11-29 NOTE — TELEPHONE ENCOUNTER
Down at the altar, this is Ross Macedo [id number]. I wound up in a hospital last night in emergency room up in [place] because I had a lot of pressure on my chest. My blood pressure was way up there in the air. It hasn't come down at all with the medication I'm on. I need to call back at [phone] and need something to get done. This is too much pressure. Thank you. You received a voice mail from [facility].

## 2023-12-13 ENCOUNTER — APPOINTMENT (OUTPATIENT)
Dept: LAB | Facility: HOSPITAL | Age: 58
End: 2023-12-13
Payer: COMMERCIAL

## 2023-12-13 ENCOUNTER — OFFICE VISIT (OUTPATIENT)
Dept: CARDIOLOGY CLINIC | Facility: CLINIC | Age: 58
End: 2023-12-13
Payer: COMMERCIAL

## 2023-12-13 VITALS
HEIGHT: 67 IN | HEART RATE: 60 BPM | BODY MASS INDEX: 28.66 KG/M2 | TEMPERATURE: 97.8 F | SYSTOLIC BLOOD PRESSURE: 160 MMHG | OXYGEN SATURATION: 99 % | WEIGHT: 182.6 LBS | DIASTOLIC BLOOD PRESSURE: 90 MMHG

## 2023-12-13 DIAGNOSIS — R07.2 PRECORDIAL PAIN: ICD-10-CM

## 2023-12-13 DIAGNOSIS — K21.9 GASTROESOPHAGEAL REFLUX DISEASE WITHOUT ESOPHAGITIS: ICD-10-CM

## 2023-12-13 DIAGNOSIS — I10 ACCELERATED HYPERTENSION: ICD-10-CM

## 2023-12-13 DIAGNOSIS — Z87.891 HISTORY OF TOBACCO ABUSE: ICD-10-CM

## 2023-12-13 DIAGNOSIS — I10 PRIMARY HYPERTENSION: Primary | ICD-10-CM

## 2023-12-13 DIAGNOSIS — E78.2 MIXED HYPERLIPIDEMIA: ICD-10-CM

## 2023-12-13 LAB
BASOPHILS # BLD AUTO: 0.07 THOUSANDS/ÂΜL (ref 0–0.1)
BASOPHILS NFR BLD AUTO: 1 % (ref 0–1)
EOSINOPHIL # BLD AUTO: 0.44 THOUSAND/ÂΜL (ref 0–0.61)
EOSINOPHIL NFR BLD AUTO: 5 % (ref 0–6)
ERYTHROCYTE [DISTWIDTH] IN BLOOD BY AUTOMATED COUNT: 13.4 % (ref 11.6–15.1)
HCT VFR BLD AUTO: 49.2 % (ref 36.5–49.3)
HGB BLD-MCNC: 16.1 G/DL (ref 12–17)
IMM GRANULOCYTES # BLD AUTO: 0.02 THOUSAND/UL (ref 0–0.2)
IMM GRANULOCYTES NFR BLD AUTO: 0 % (ref 0–2)
LYMPHOCYTES # BLD AUTO: 3.87 THOUSANDS/ÂΜL (ref 0.6–4.47)
LYMPHOCYTES NFR BLD AUTO: 40 % (ref 14–44)
MCH RBC QN AUTO: 28.8 PG (ref 26.8–34.3)
MCHC RBC AUTO-ENTMCNC: 32.7 G/DL (ref 31.4–37.4)
MCV RBC AUTO: 88 FL (ref 82–98)
MONOCYTES # BLD AUTO: 1.12 THOUSAND/ÂΜL (ref 0.17–1.22)
MONOCYTES NFR BLD AUTO: 12 % (ref 4–12)
NEUTROPHILS # BLD AUTO: 4.06 THOUSANDS/ÂΜL (ref 1.85–7.62)
NEUTS SEG NFR BLD AUTO: 42 % (ref 43–75)
NRBC BLD AUTO-RTO: 0 /100 WBCS
PLATELET # BLD AUTO: 276 THOUSANDS/UL (ref 149–390)
PMV BLD AUTO: 8.5 FL (ref 8.9–12.7)
RBC # BLD AUTO: 5.6 MILLION/UL (ref 3.88–5.62)
TSH SERPL DL<=0.05 MIU/L-ACNC: 2.14 UIU/ML (ref 0.45–4.5)
WBC # BLD AUTO: 9.58 THOUSAND/UL (ref 4.31–10.16)

## 2023-12-13 PROCEDURE — 99214 OFFICE O/P EST MOD 30 MIN: CPT | Performed by: NURSE PRACTITIONER

## 2023-12-13 PROCEDURE — 82088 ASSAY OF ALDOSTERONE: CPT

## 2023-12-13 PROCEDURE — 85025 COMPLETE CBC W/AUTO DIFF WBC: CPT

## 2023-12-13 PROCEDURE — 84244 ASSAY OF RENIN: CPT

## 2023-12-13 PROCEDURE — 83835 ASSAY OF METANEPHRINES: CPT | Performed by: NURSE PRACTITIONER

## 2023-12-13 RX ORDER — AMLODIPINE BESYLATE 5 MG/1
5 TABLET ORAL DAILY
Qty: 30 TABLET | Refills: 11 | Status: SHIPPED | OUTPATIENT
Start: 2023-12-13

## 2023-12-13 NOTE — PROGRESS NOTES
Cardiology Follow Up    Kimberlee Jacobson  1965  [id number]  [facility] [place] [address] [address]  [address] [facility] [place]  [phone]    Bhargavi Mane presents for routine follow up for chest pain, hypertension, hyperlipidemia and ongoing tobacco use. 1. Primary hypertension  Assessment & Plan:  Blood pressure remains above goal.  Continue losartan 100 mg daily. Increase amlodipine to 5 mg daily. Renal artery u/s, TSH, renin/aldosterone level, metanephrine testing ordered. Continue with 2 g sodium diet, weight loss efforts, and smoking cessation. Labs now. Monitor BP and call with readings within 1 week. Will further titrate regimen as needed. 2. Mixed hyperlipidemia  Assessment & Plan:  Lipid panel 5/11/2023: C 225. T 119. H 49. L 152. Atorvastatin 40 mg daily added. Lipid panel 10/25/2023: C 160. T 132. H 52. L 82. Goal LDL < 100. Continue current regimen. 3. Accelerated hypertension  Comments:  renal artery duplex, TSH, renin/aldosterone, metanephrine testing  Orders:  -     Aldosterone/Renin Ratio; Future  -     Metanephrine, Fractionated Plasma Free  -     CBC and differential; Future  -     VAS renal artery complete; Future; Expected date: 12/13/2023  -     amLODIPine (NORVASC) 5 mg tablet; Take 1 tablet (5 mg total) by mouth daily    4. Precordial pain  Assessment & Plan:  Patient reported intermittent left sided chest pain followed typically by 'gassy' pain often relieved with burping. Pain seems to have a GI component but he does have risk factors for coronary artery disease.   The 10-year ASCVD risk score (Benny DK, et al., 2019) is: 9.7%    Values used to calculate the score:      Age: 62 years      Sex: Male      Is Non- : No      Diabetic: No      Tobacco smoker: No      Systolic Blood Pressure: 638 mmHg      Is BP treated: Yes      HDL Cholesterol: 52 mg/dL      Total Cholesterol: 160 mg/dL Continue aspirin 81 mg daily and statin for goal LDL < 100. Echocardiogram 6/2023 with mod LVH. Exercise stress echo 7/2023 shows no evidence of ischemia with good exercise capacity. Working toward optimal BP control. Will continue to monitor closely. 5. History of tobacco abuse  Assessment & Plan:  I applauded his smoking cessation! 6. Gastroesophageal reflux disease without esophagitis  Assessment & Plan:  Noted feeling 'gassy' often relieved with burping at initial visit. Intermittent heartburn symptoms. Symptoms resolved with pantoprazole 40 mg daily. Recommend GI evaluation. HPI   Larry Parra has a past medical history of hypertension, hyperlipidemia and tobacco use. He also has a history of rheumatic fever. Unclear history of possible angioplasty and cardiomyopathy. Larry Parra underwent cardiac catheterization at the age of 36 and tells me that he underwent balloon angioplasty to 'lower blood pressure'. This was done at [facility] in [place]. Details are unclear. Larry Parra has a history of heart murmur and known history of prior rheumatic fever. He established care with Dr. Jessy Bhakta on 1/12/2023 for evaluation of hypertension, hyperlipidemia, and chest pain. He described left sided chest pain followed by a "gassy" feeling. Pain was relieved with belching. Denied overt chest pain or heaviness. No shortness of breath or dyspnea on exertion. Occasional "fluttering" in his chest. No edema in his legs but occasional edema in his hands. He was actively working on weight loss and was down 11 pounds in 2 months. He had cut back on cigarettes from 1 PPD to 10 cig daily. ECG showed SB at 59 bpm and incomplete RBBB. An echocardiogram and exercise stress test were ordered. Pantoprazole 40 mg daily x 30 days was prescribed. He was instructed to monitor his blood pressures.     He followed up with me on 5/15/2023 at which time he had not yet completed his echocardiogram or stress test. He was smoking 1/2 PPD. BP remained elevated at home and his OV. LDL was 152 on recent labs. Ha1c 5.9. He reported resolution of his chest discomfort with starting pantoprazole. He was started on losartan 25 mg daily and atorvastatin 40 mg daily. Smoking cessation and dietary modifications reviewed. Echocardiogram 6/3/2023 shows EF 65% with mod LVH and mildly dilated LA with trace MR. Exercise stress echo 7/28/2023 showed no evidence of ischemia at 10.1 METs and 87% MPHR. Hypertension with normal response to exercise noted. He was evaluated in [address] [place] [address] ER 11/28/2023 for chest discomfort and hypertension. BP was 209/93. ECG showed NSR. HS trop negative. Chest xray unremarkable. He was discharged home. His wife reached out to our office. She reported he is under significant work stress. Amlodipine 2.5 mg daily was added. 12/13/2023Conmarcella Parra presents for routine follow up. BP remains very elevated today. He cites significant stressors at work. He remains very active. BP at home runs 140-170/80-90's. He denies recurrent chest pressure. He denies exertional chest discomfort. He does have dyspnea with significant exertion. No lightheadedness or edema. He is taking his medications as prescribed and denies side effects. He has quit smoking completely. He changed his diet and has been losing weight. He avoids salt and sugar. He completed a lipid panel 10/25 which showed interval improvement in LDL to 82. We reviewed the results of his echo and stress test in detail.     Medical Problems       Problem List       Primary hypertension    Hyperlipidemia    Tobacco use    Precordial pain    Gastroesophageal reflux disease    History of rheumatic fever    Pain of left lower extremity    Prediabetes    History of tobacco abuse        Past Medical History:   Diagnosis Date    Cardiomyopathy ([address])     Unclear details    History of angioplasty     Unclear details    HTN (hypertension)     diagnosed 2020    Hyperlipidemia Rheumatic fever 1969    Tobacco use      Social History     Socioeconomic History    Marital status: /Civil Union     Spouse name: [facility]    Number of children: Not on file    Years of education: Not on file    Highest education level: Not on file   Occupational History    Occupation: Business owner   Tobacco Use    Smoking status: Former     Current packs/day: 0.00     Average packs/day: 0.5 packs/day for 40.0 years (20.0 ttl pk-yrs)     Types: Cigarettes     Start date: 8/15/1983     Quit date: 8/15/2023     Years since quittin.3    Smokeless tobacco: Never    Tobacco comments:     Actively cutting back and trying to abstain.    Vaping Use    Vaping status: Never Used   Substance and Sexual Activity    Alcohol use: Not Currently     Comment: twice monthly    Drug use: Never    Sexual activity: Not on file     Comment: Defer   Other Topics Concern    Not on file   Social History Narrative    Not on file     Social Determinants of Health     Financial Resource Strain: Not on file   Food Insecurity: Not on file   Transportation Needs: Not on file   Physical Activity: Not on file   Stress: Not on file   Social Connections: Not on file   Intimate Partner Violence: Not on file   Housing Stability: Not on file      Family History   Problem Relation Age of Onset    Cancer Mother     Alcohol abuse Father 62    Hypertension Father     Alcohol abuse Brother     Drug abuse Brother     Lung disease Maternal Grandmother     Lung disease Maternal Grandfather     Lung disease Paternal Grandfather      Past Surgical History:   Procedure Laterality Date    ANGIOPLASTY      age 36    DENTAL SURGERY  2023    KNEE ARTHROSCOPY Left     teenager       Current Outpatient Medications:     amLODIPine (NORVASC) 5 mg tablet, Take 1 tablet (5 mg total) by mouth daily, Disp: 30 tablet, Rfl: 11    atorvastatin (LIPITOR) 40 mg tablet, Take 1 tablet (40 mg total) by mouth daily, Disp: 90 tablet, Rfl: 1    losartan (COZAAR) 100 MG tablet, Take 1 tablet (100 mg total) by mouth daily, Disp: 90 tablet, Rfl: 3    pantoprazole (PROTONIX) 40 mg tablet, TAKE ONE TABLET BY MOUTH EVERY DAY, Disp: 90 tablet, Rfl: 0  No Known Allergies    Labs:     Chemistry        Component Value Date/Time    K 3.9 11/28/2023 0019     11/28/2023 0019    CO2 30 11/28/2023 0019    BUN 14 11/28/2023 0019    CREATININE 1.04 11/28/2023 0019        Component Value Date/Time    CALCIUM 9.3 11/28/2023 0019    ALKPHOS 74 11/28/2023 0019    AST 22 11/28/2023 0019    ALT 28 11/28/2023 0019        Lipid panel 10/25/2023: C 160. T 132. H 52. L 82. Lipid panel 5/11/2023: C 225. T 119. H 49. L 152. HA1c 5/11/2023: 5.9    Echo stress 7/28/2023:    Echo Post Impression: The study is normal.    Peak Stress Echo: Left ventricle cavity has normal reduction in size at peak stress. The left ventricle systolic function is normal at peak stress. The peak stress echo showed normal wall motion. Stress ECG: No ST deviation is noted. There were no arrhythmias during stress. The stress ECG is negative for ischemia after maximal exercise, without reproduction of symptoms. Stress ECG: Overall, the patient's exercise capacity was normal for their age. The patient reached stage 3.0 of the protocol after exercising for 9 min and 0 sec and had a maximal HR of 142 bpm (87 % of MPHR) and 10.1 METS. Left Ventricle: Left ventricular cavity size is normal. Wall thickness is normal. The left ventricular ejection fraction is 55-59%. Systolic function is normal. Wall motion is normal.     Echo 6/3/2023:     Left Ventricle: Left ventricular cavity size is normal. Wall thickness is moderately increased. The left ventricular ejection fraction is 65%. Systolic function is normal. Wall motion is normal. Diastolic function is mildly abnormal, consistent with grade I (abnormal) relaxation. Left atrial filling pressure is normal.    IVS: There is sigmoid appearance of the septum. Left Atrium:  The atrium is mildly dilated. Mitral Valve: There is trace regurgitation. There is no evidence of stenosis. Tricuspid Valve: There is trace regurgitation. There is no evidence of stenosis. ECG 1/12/2023: Sinus bradycardia. 59 bpm. IRBBB. Lipid panel 4/7/2021: C 262. T 173. H 47. L 180. HgA1c 4/7/2021: 5.8%. (Prediabetic 5.7-6.4%). Review of Systems   Constitutional: Negative. HENT: Negative. Cardiovascular:  Positive for dyspnea on exertion. Negative for chest pain, irregular heartbeat, near-syncope, orthopnea and palpitations. Respiratory:  Negative for cough and snoring. Endocrine: Negative. Skin: Negative. Musculoskeletal: Negative. Gastrointestinal: Negative. Genitourinary: Negative. Neurological: Negative. Psychiatric/Behavioral: Negative. Vitals:    12/13/23 0945   BP: 160/90   Pulse:    Temp:    SpO2:      Vitals:    12/13/23 0843   Weight: 82.8 kg (182 lb 9.6 oz)     Height: 5' 7" (170.2 cm)   Body mass index is 28.6 kg/m². Physical Exam  Vitals and nursing note reviewed. Constitutional:       General: He is not in acute distress. Appearance: He is well-developed. He is not diaphoretic. HENT:      Head: Normocephalic and atraumatic. Neck:      Vascular: No carotid bruit or JVD. Cardiovascular:      Rate and Rhythm: Normal rate and regular rhythm. Pulses: Intact distal pulses. Heart sounds: Normal heart sounds, S1 normal and S2 normal. No murmur heard. No friction rub. No gallop. Pulmonary:      Effort: Pulmonary effort is normal. No respiratory distress. Breath sounds: Normal breath sounds. Abdominal:      General: There is no distension. Palpations: Abdomen is soft. Tenderness: There is no abdominal tenderness. Skin:     General: Skin is warm and dry. Findings: No rash. Neurological:      Mental Status: He is alert and oriented to person, place, and time.    Psychiatric:         Behavior: Behavior normal.

## 2023-12-13 NOTE — PATIENT INSTRUCTIONS
Stress test did not show evidence of a blockage. BP continues to be elevated. Renal artery ultrasound. Labs today. I will let you know results. Increase amlodipine to 5 mg daily. Contact the office in 1-2 weeks with BP readings.

## 2023-12-14 PROBLEM — Z72.0 TOBACCO USE: Status: RESOLVED | Noted: 2021-07-21 | Resolved: 2023-12-14

## 2023-12-14 NOTE — ASSESSMENT & PLAN NOTE
Noted feeling 'gassy' often relieved with burping at initial visit. Intermittent heartburn symptoms. Symptoms resolved with pantoprazole 40 mg daily. Recommend GI evaluation.

## 2023-12-14 NOTE — ASSESSMENT & PLAN NOTE
Patient reported intermittent left sided chest pain followed typically by 'gassy' pain often relieved with burping. Pain seems to have a GI component but he does have risk factors for coronary artery disease. The 10-year ASCVD risk score (Benny VELEZ, et al., 2019) is: 9.7%    Values used to calculate the score:      Age: 62 years      Sex: Male      Is Non- : No      Diabetic: No      Tobacco smoker: No      Systolic Blood Pressure: 602 mmHg      Is BP treated: Yes      HDL Cholesterol: 52 mg/dL      Total Cholesterol: 160 mg/dL     Continue aspirin 81 mg daily and statin for goal LDL < 100. Echocardiogram 6/2023 with mod LVH. Exercise stress echo 7/2023 shows no evidence of ischemia with good exercise capacity. Working toward optimal BP control. Will continue to monitor closely.

## 2023-12-14 NOTE — ASSESSMENT & PLAN NOTE
Lipid panel 5/11/2023: C 225. T 119. H 49. L 152. Atorvastatin 40 mg daily added. Lipid panel 10/25/2023: C 160. T 132. H 52. L 82. Goal LDL < 100. Continue current regimen.

## 2023-12-14 NOTE — ASSESSMENT & PLAN NOTE
Blood pressure remains above goal.  Continue losartan 100 mg daily. Increase amlodipine to 5 mg daily. Renal artery u/s, TSH, renin/aldosterone level, metanephrine testing ordered. Continue with 2 g sodium diet, weight loss efforts, and smoking cessation. Labs now. Monitor BP and call with readings within 1 week. Will further titrate regimen as needed.

## 2023-12-18 ENCOUNTER — TELEPHONE (OUTPATIENT)
Dept: GASTROENTEROLOGY | Facility: CLINIC | Age: 58
End: 2023-12-18

## 2023-12-18 NOTE — TELEPHONE ENCOUNTER
Spoke to patient seems he went to wrong office. Asked if he could make it to [facility] at 9:30 am he stated per GPS he would we arriving 9:40 am but he wasn't going to be rushing and stated [facility] provided wrong address. He said he was angry and didn't want to talk and hung up.

## 2023-12-26 LAB
ALDOST SERPL-MCNC: 1.7 NG/DL (ref 0–30)
ALDOST/RENIN PLAS-RTO: 0.8 {RATIO} (ref 0–30)
RENIN PLAS-CCNC: 2.1 NG/ML/HR (ref 0.17–5.38)

## 2023-12-27 LAB
METANEPH FREE SERPL-MCNC: 29.7 PG/ML (ref 0–88)
NORMETANEPHRINE SERPL-MCNC: 97.7 PG/ML (ref 0–244)

## 2024-01-24 ENCOUNTER — TELEPHONE (OUTPATIENT)
Dept: FAMILY MEDICINE CLINIC | Facility: CLINIC | Age: 59
End: 2024-01-24

## 2024-01-24 ENCOUNTER — TELEPHONE (OUTPATIENT)
Age: 59
End: 2024-01-24

## 2024-01-24 NOTE — TELEPHONE ENCOUNTER
Pt had appt with valorie this am and called to speak to provider only in regards to a medication he shouldn't be on and how the appt today was a waste. I warm transferred to [facility] to further advise on what natasha said.

## 2024-01-31 ENCOUNTER — HOSPITAL ENCOUNTER (OUTPATIENT)
Dept: NON INVASIVE DIAGNOSTICS | Facility: HOSPITAL | Age: 59
Discharge: HOME | End: 2024-01-31
Payer: COMMERCIAL

## 2024-01-31 DIAGNOSIS — I10 ACCELERATED HYPERTENSION: ICD-10-CM

## 2024-01-31 PROCEDURE — 93975 VASCULAR STUDY: CPT | Performed by: SURGERY

## 2024-01-31 PROCEDURE — 93975 VASCULAR STUDY: CPT

## 2024-02-06 ENCOUNTER — OFFICE VISIT (OUTPATIENT)
Dept: FAMILY MEDICINE CLINIC | Facility: CLINIC | Age: 59
End: 2024-02-06
Payer: COMMERCIAL

## 2024-02-06 ENCOUNTER — APPOINTMENT (OUTPATIENT)
Dept: LAB | Facility: CLINIC | Age: 59
End: 2024-02-06
Payer: COMMERCIAL

## 2024-02-06 VITALS
HEART RATE: 51 BPM | HEIGHT: 67 IN | SYSTOLIC BLOOD PRESSURE: 144 MMHG | BODY MASS INDEX: 28.5 KG/M2 | TEMPERATURE: 97.9 F | WEIGHT: 181.6 LBS | DIASTOLIC BLOOD PRESSURE: 86 MMHG | OXYGEN SATURATION: 97 %

## 2024-02-06 DIAGNOSIS — I10 PRIMARY HYPERTENSION: Primary | ICD-10-CM

## 2024-02-06 DIAGNOSIS — Z12.5 SCREENING FOR PROSTATE CANCER: ICD-10-CM

## 2024-02-06 DIAGNOSIS — S20.362A TICK BITE OF LEFT FRONT WALL OF THORAX, INITIAL ENCOUNTER: ICD-10-CM

## 2024-02-06 DIAGNOSIS — R73.03 PREDIABETES: ICD-10-CM

## 2024-02-06 DIAGNOSIS — A69.20 LYME DISEASE: ICD-10-CM
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
## 2024-02-06 DIAGNOSIS — F43.9 STRESS: ICD-10-CM

## 2024-02-06 DIAGNOSIS — E78.2 MIXED HYPERLIPIDEMIA: ICD-10-CM

## 2024-02-06 DIAGNOSIS — Z87.891 HISTORY OF TOBACCO ABUSE: ICD-10-CM

## 2024-02-06 LAB — PSA SERPL-MCNC: 0.24 NG/ML (ref 0–4)

## 2024-02-06 PROCEDURE — G0103 PSA SCREENING: HCPCS

## 2024-02-06 PROCEDURE — 99214 OFFICE O/P EST MOD 30 MIN: CPT | Performed by: NURSE PRACTITIONER

## 2024-02-06 PROCEDURE — 36415 COLL VENOUS BLD VENIPUNCTURE: CPT

## 2024-02-06 RX ORDER — DOXYCYCLINE HYCLATE 100 MG/1
100 CAPSULE ORAL EVERY 12 HOURS SCHEDULED
Qty: 28 CAPSULE | Refills: 0 | Status: SHIPPED | OUTPATIENT
Start: 2024-02-06 | End: 2024-02-20

## 2024-02-06 NOTE — ASSESSMENT & PLAN NOTE
Known tick bite  Area red/inflamed today  Will tx: Doxycycline 100 BID x10 days  Monitor for infection  Return to care with concerns/changes

## 2024-02-06 NOTE — PROGRESS NOTES
[facility]    ASSESSMENT AND PLAN     1. Primary hypertension  Assessment & Plan:  Blood pressure remains above goal  Reports compliance with Losartan 100; Amlodipine 5  Is following with cardiology   Has next f/u scheduled for 3/6  BP today: 144/86  Reports home pressure usually similar  Has been making dietary improvements, remains smoke free  Discussed likely stress component  Discussed increasing Amlodipine - pt declines today.  He would prefer to work on stress management and see if that helps to decrease it before adding medication.  He is well aware of risk factors associated with uncontrolled BP - immediate and long term  Advise to continue home checks - take machine and readings to cardiology f/u  ER precaution with any acute change      2. Mixed hyperlipidemia  Assessment & Plan:  Great improvement with statin  Atorvastatin 40  Denies myalgia    Lipids in October:  Total 160; LDL 82    Current ASCVD risk 9.7      3. Prediabetes  Assessment & Plan:  6.2 October 2023  Recheck with next labs  Consider Metformin  - risk reduction      Orders:  -     Hemoglobin A1C; Future; Expected date: 05/06/2024    4. History of tobacco abuse    5. Screening for prostate cancer  -     PSA, Total Screen; Future    6. Lyme disease  -     doxycycline hyclate (VIBRAMYCIN) 100 mg capsule; Take 1 capsule (100 mg total) by mouth every 12 (twelve) hours for 14 days    7. Tick bite of left front wall of thorax, initial encounter  Assessment & Plan:  Known tick bite  Area red/inflamed today  Will tx: Doxycycline 100 BID x10 days  Monitor for infection  Return to care with concerns/changes    Orders:  -     doxycycline hyclate (VIBRAMYCIN) 100 mg capsule; Take 1 capsule (100 mg total) by mouth every 12 (twelve) hours for 14 days    8. Stress  Assessment & Plan:  Home and work stressors discussed at length today  Discussed health risks with continued high stress  Not agreeable to medication  Is agreeable to possible therapy  "- referral placed  Names of potential providers provided  Advised to look into wife NIRAV as well (she is a network employee)    Orders:  -     Ambulatory referral to Psych Services; Future           SUBJECTIVE       Patient ID: [place] is a 58 y.o. male.    Chief Complaint   Patient presents with    Follow-up       HISTORY OF PRESENT ILLNESS    Follow up  BP still elevated          The following portions of the patient's history were reviewed and updated as appropriate: allergies, current medications, past family history, past medical history, past social history, past surgical history, and problem list.    REVIEW OF SYSTEMS  Review of Systems   Constitutional: Negative.    Respiratory: Negative.     Cardiovascular: Negative.    Gastrointestinal: Negative.    Genitourinary: Negative.    Neurological: Negative.    Psychiatric/Behavioral: Negative.          Daily stress       OBJECTIVE      VITAL SIGNS  /86 (BP Location: Left arm, Patient Position: Sitting, Cuff Size: Adult)   Pulse (!) 51   Temp 97.9 °F (36.6 °C)   Ht 5' 7\" (1.702 m)   Wt 82.4 kg (181 lb 9.6 oz)   SpO2 97%   BMI 28.44 kg/m²     CURRENT MEDICATIONS    Current Outpatient Medications:     amLODIPine (NORVASC) 5 mg tablet, Take 1 tablet (5 mg total) by mouth daily, Disp: 30 tablet, Rfl: 11    atorvastatin (LIPITOR) 40 mg tablet, Take 1 tablet (40 mg total) by mouth daily, Disp: 90 tablet, Rfl: 1    doxycycline hyclate (VIBRAMYCIN) 100 mg capsule, Take 1 capsule (100 mg total) by mouth every 12 (twelve) hours for 14 days, Disp: 28 capsule, Rfl: 0    losartan (COZAAR) 100 MG tablet, Take 1 tablet (100 mg total) by mouth daily, Disp: 90 tablet, Rfl: 3    pantoprazole (PROTONIX) 40 mg tablet, TAKE ONE TABLET BY MOUTH EVERY DAY, Disp: 90 tablet, Rfl: 0      PHYSICAL EXAMINATION   Physical Exam  Vitals and nursing note reviewed.   Constitutional:       General: He is not in acute distress.     Appearance: Normal appearance. He is " well-developed. He is not ill-appearing.   HENT:      Head: Normocephalic.   Cardiovascular:      Rate and Rhythm: Normal rate and regular rhythm.   Pulmonary:      Effort: Pulmonary effort is normal. No respiratory distress.      Breath sounds: Normal breath sounds and air entry.   Neurological:      Mental Status: He is alert and oriented to person, place, and time.   Psychiatric:         Attention and Perception: Attention normal.         Mood and Affect: Mood normal.         Behavior: Behavior normal.         Thought Content: Thought content normal.         Judgment: Judgment normal.

## 2024-02-06 NOTE — ASSESSMENT & PLAN NOTE
Home and work stressors discussed at length today  Discussed health risks with continued high stress  Not agreeable to medication  Is agreeable to possible therapy - referral placed  Names of potential providers provided  Advised to look into wife NIRAV as well (she is a network employee)

## 2024-02-06 NOTE — ASSESSMENT & PLAN NOTE
Great improvement with statin  Atorvastatin 40  Denies myalgia    Lipids in October:  Total 160; LDL 82    Current ASCVD risk 9.7

## 2024-02-06 NOTE — ASSESSMENT & PLAN NOTE
Blood pressure remains above goal  Reports compliance with Losartan 100; Amlodipine 5  Is following with cardiology   Has next f/u scheduled for 3/6  BP today: 144/86  Reports home pressure usually similar  Has been making dietary improvements, remains smoke free  Discussed likely stress component  Discussed increasing Amlodipine - pt declines today.  He would prefer to work on stress management and see if that helps to decrease it before adding medication.  He is well aware of risk factors associated with uncontrolled BP - immediate and long term  Advise to continue home checks - take machine and readings to cardiology f/u  ER precaution with any acute change

## 2024-02-19 ENCOUNTER — TELEPHONE (OUTPATIENT)
Dept: CARDIOLOGY CLINIC | Facility: CLINIC | Age: 59
End: 2024-02-19

## 2024-02-19 NOTE — TELEPHONE ENCOUNTER
----- Message from CARLA Cooley sent at 2/18/2024  1:34 PM EST -----  To his request for leg study, I wish to discuss with him at his upcoming appt.  Thank you

## 2024-02-26 ENCOUNTER — TELEPHONE (OUTPATIENT)
Dept: PSYCHIATRY | Facility: CLINIC | Age: 59
End: 2024-02-26

## 2024-02-26 NOTE — TELEPHONE ENCOUNTER
Second outreach attempted and unsuccessful. Message left for patient to call back if interested in services.   Referral is being closed at this time.

## 2024-02-26 NOTE — TELEPHONE ENCOUNTER
Spoke with pt stated he didn't want to be placed on wait-list currently has someone he is speaking with. Writer informed pt should anything change he can contact intake dept.     Referral will be closed at this time.

## 2024-04-01 ENCOUNTER — OFFICE VISIT (OUTPATIENT)
Dept: CARDIOLOGY CLINIC | Facility: CLINIC | Age: 59
End: 2024-04-01
Payer: COMMERCIAL

## 2024-04-01 ENCOUNTER — APPOINTMENT (OUTPATIENT)
Dept: LAB | Facility: HOSPITAL | Age: 59
End: 2024-04-01

## 2024-04-01 VITALS
HEART RATE: 78 BPM | SYSTOLIC BLOOD PRESSURE: 132 MMHG | WEIGHT: 175.4 LBS | BODY MASS INDEX: 27.53 KG/M2 | HEIGHT: 67 IN | OXYGEN SATURATION: 98 % | DIASTOLIC BLOOD PRESSURE: 80 MMHG

## 2024-04-01 DIAGNOSIS — Z00.8 ENCOUNTER FOR OTHER GENERAL EXAMINATION: ICD-10-CM

## 2024-04-01 DIAGNOSIS — I10 PRIMARY HYPERTENSION: Primary | ICD-10-CM

## 2024-04-01 DIAGNOSIS — E78.2 MIXED HYPERLIPIDEMIA: ICD-10-CM

## 2024-04-01 PROBLEM — E78.3 MIXED HYPERGLYCERIDEMIA: Status: ACTIVE | Noted: 2021-06-17

## 2024-04-01 LAB
CHOLEST SERPL-MCNC: 290 MG/DL
EST. AVERAGE GLUCOSE BLD GHB EST-MCNC: 134 MG/DL
HBA1C MFR BLD: 6.3 %
HDLC SERPL-MCNC: 49 MG/DL
LDLC SERPL CALC-MCNC: 181 MG/DL (ref 0–100)
NONHDLC SERPL-MCNC: 241 MG/DL
TRIGL SERPL-MCNC: 299 MG/DL

## 2024-04-01 PROCEDURE — 36415 COLL VENOUS BLD VENIPUNCTURE: CPT

## 2024-04-01 PROCEDURE — 80061 LIPID PANEL: CPT

## 2024-04-01 PROCEDURE — 99214 OFFICE O/P EST MOD 30 MIN: CPT | Performed by: INTERNAL MEDICINE

## 2024-04-01 PROCEDURE — 83036 HEMOGLOBIN GLYCOSYLATED A1C: CPT

## 2024-04-01 NOTE — ASSESSMENT & PLAN NOTE
Developed fatigue February and most recent lipid profile showed significantly elevated total cholesterol, triglycerides as well as LDL.  The next option is to try Nexletol with or without combination withezetimibe and if the numbers are still not on target, the final option would be PCSK9 inhibitor therapy.  We will check with insurance coverage first.

## 2024-04-01 NOTE — PROGRESS NOTES
[facility] [place]  [address]  [address]  [facility] [address] [phone]    Patient Name: Hadley Lake  YOB: 1965 ;male  MR No: [id number]      Diagnosis ICD-10-CM Associated Orders   1. Primary hypertension  I10       2. Mixed hyperlipidemia  E78.2           ASSESSMENT AND RECOMMENDATIONS:  1. Primary hypertension  Assessment & Plan:  BP is quite good on current meds. Continue same.  Workup for secondary hypertension has been negative.  His aldosterone renin ratio as well as renal artery duplex ultrasound in January 2024 were both normal.      2. Mixed hyperlipidemia  Assessment & Plan:  Developed fatigue February and most recent lipid profile showed significantly elevated total cholesterol, triglycerides as well as LDL.  The next option is to try Nexletol with or without combination withezetimibe and if the numbers are still not on target, the final option would be PCSK9 inhibitor therapy.  We will check with insurance coverage first.         HISTORY OF PRESENT ILLNESS:    58-year-old male with past medical history significant for hypertension and mixed hyperlipidemia presents for his routine follow-up visit.  He is quite active at work and denies any chest pain, dyspnea on exertion, palpitations or syncope.  He stopped smoking about a year and a half ago.    CURRENT  MEDICATIONS:      Current Outpatient Medications:     amLODIPine (NORVASC) 5 mg tablet, Take 1 tablet (5 mg total) by mouth daily, Disp: 30 tablet, Rfl: 11    losartan (COZAAR) 100 MG tablet, Take 1 tablet (100 mg total) by mouth daily, Disp: 90 tablet, Rfl: 3    pantoprazole (PROTONIX) 40 mg tablet, TAKE ONE TABLET BY MOUTH EVERY DAY, Disp: 90 tablet, Rfl: 0    atorvastatin (LIPITOR) 40 mg tablet, Take 1 tablet (40 mg total) by mouth daily, Disp: 90 tablet, Rfl: 1    ALLERGIES  No Known Allergies    Lab Results   Component Value Date    LDLCALC 181 (H) 04/01/2024     LDLCALC 82 10/25/2023    HDL 49 04/01/2024    HDL 52 10/25/2023    CHOLESTEROL 290 (H) 04/01/2024    CHOLESTEROL 160 10/25/2023    TRIG 299 (H) 04/01/2024    TRIG 132 10/25/2023    CREATININE 1.04 11/28/2023    CREATININE 0.91 10/25/2023    K 3.9 11/28/2023    K 4.8 10/25/2023    SODIUM 138 11/28/2023    SODIUM 141 10/25/2023         REVIEW OF SYSTEMS   Positive for: As per HPI  Negative for: All remaining as reviewed below and in HPI.    SYSTEM SYMPTOMS REVIEWED:  General--weight change, fever, night sweats  Respiratory--cough, wheezing, shortness of breath, sputum production  Cardiovascular--chest pain, syncope, dyspnea on exertion, edema, decline in exercise tolerance, claudication   Gastrointestinal--persistent vomiting, diarrhea, abdominal distention, blood in stool   Muscular or skeletal--joint pain or swelling   Neurologic--headaches, syncope, abnormal movement  Hematologic--history of easy bruising and bleeding   Endocrine--thyroid enlargement, heat or cold intolerance, polyuria   Psychiatric--anxiety, depression     Vitals:    04/01/24 1256   BP: 132/80   Pulse: 78   SpO2: 98%      Body mass index is 27.47 kg/m².  Wt Readings from Last 3 Encounters:   04/01/24 79.6 kg (175 lb 6.4 oz)   02/06/24 82.4 kg (181 lb 9.6 oz)   01/24/24 82.6 kg (182 lb 3.2 oz)       PHYSICAL EXAM     General Appearance:    Alert, cooperative, no distress, appears stated age,       Head, Eyes, ENT:    No obvious abnormality, moist mucous mebranes.   Neck:   Supple, no carotid bruit. No JVD, No lymphadenopathy   Back:     Symmetric, no scoliosis or kyphosis   Lungs:     Respirations unlabored. Good air entry bilaterally. Clear to    auscultation bilaterally,    Chest wall:    No tenderness or deformity   Heart:    Regular rate and rhythm, S1 and S2 normal, no murmur,    rubs or gallops   Abdomen:     Soft, non-tender, No obvious masses, or organomegaly   Extremities:   Extremities warm. No cyanosis. No edema. Normal upper    and  "lower ext motor strength.    Skin:   No venostatic changes in lower extremities. Normal skin     color, texture, and turgor. No rashes or lesions   Neuro:        Pt is alert and oriented with no gross motor deficits.   Psychiatric/Behavioral:      Mood is normal. Behavior is normal.       Jalil Aponte MD, FACC, FASE    Portions of the record  have been created with voice recognition software.  Occasional grammatical mistakes or wrong word or \"sound alike\" substitutions may have occurred due to the inherent limitations of voice recognition software. Please reach out to me directly for any clarifications.   "

## 2024-04-01 NOTE — ASSESSMENT & PLAN NOTE
BP is quite good on current meds. Continue same.  Workup for secondary hypertension has been negative.  His aldosterone renin ratio as well as renal artery duplex ultrasound in January 2024 were both normal.

## 2024-04-03 ENCOUNTER — TELEPHONE (OUTPATIENT)
Age: 59
End: 2024-04-03

## 2024-04-03 NOTE — TELEPHONE ENCOUNTER
Last visit 02/06/2024  Next appt 09/10/2024    Patient requested to speak directly with Dr Perez and stated, not the back and forth with messages. Patient wants to discuss the statin medication and his cholesterol. Patient also stated that he normally takes a muscle relaxer medication when he really needs it. Patient explained that he keeps 5 pills for the year and uses it when he really needs it. If I spelled the medication correctly, (please verify), I believe it is Skelaxin. Patient is asking for a muscle relaxer, since he took the last pill on Saturday. Patient stated he has ongoing issues with his back and just recently it became worse. [facility]. Please advise.

## 2024-04-03 NOTE — TELEPHONE ENCOUNTER
Pls call pt and schedule appt to discuss his back pain. This has not been discussed in the past and there is no prior prescription for Skelaxin in the system.

## 2024-04-17 ENCOUNTER — OFFICE VISIT (OUTPATIENT)
Dept: FAMILY MEDICINE CLINIC | Facility: CLINIC | Age: 59
End: 2024-04-17
Payer: COMMERCIAL

## 2024-04-17 VITALS
SYSTOLIC BLOOD PRESSURE: 144 MMHG | HEART RATE: 61 BPM | WEIGHT: 179.4 LBS | TEMPERATURE: 97.9 F | DIASTOLIC BLOOD PRESSURE: 86 MMHG | HEIGHT: 67 IN | OXYGEN SATURATION: 98 % | BODY MASS INDEX: 28.16 KG/M2

## 2024-04-17 DIAGNOSIS — R06.2 WHEEZING: ICD-10-CM

## 2024-04-17 DIAGNOSIS — G89.29 CHRONIC LEFT-SIDED LOW BACK PAIN WITHOUT SCIATICA: Primary | ICD-10-CM

## 2024-04-17 DIAGNOSIS — M54.50 CHRONIC LEFT-SIDED LOW BACK PAIN WITHOUT SCIATICA: Primary | ICD-10-CM

## 2024-04-17 DIAGNOSIS — I10 PRIMARY HYPERTENSION: ICD-10-CM

## 2024-04-17 DIAGNOSIS — E78.2 MIXED HYPERLIPIDEMIA: ICD-10-CM

## 2024-04-17 PROCEDURE — 99214 OFFICE O/P EST MOD 30 MIN: CPT | Performed by: NURSE PRACTITIONER

## 2024-04-17 RX ORDER — AMLODIPINE BESYLATE 10 MG/1
10 TABLET ORAL DAILY
Qty: 90 TABLET | Refills: 1 | Status: SHIPPED | OUTPATIENT
Start: 2024-04-17

## 2024-04-17 RX ORDER — METAXALONE 400 MG/1
400 TABLET ORAL 3 TIMES DAILY PRN
Qty: 10 TABLET | Refills: 0 | Status: SHIPPED | OUTPATIENT
Start: 2024-04-17

## 2024-04-17 RX ORDER — ALBUTEROL SULFATE 90 UG/1
2 AEROSOL, METERED RESPIRATORY (INHALATION) EVERY 6 HOURS PRN
Qty: 6.7 G | Refills: 1 | Status: SHIPPED | OUTPATIENT
Start: 2024-04-17

## 2024-04-17 NOTE — ASSESSMENT & PLAN NOTE
Recent respiratory illness   Wife with same  Symptoms have been improving  Still with some occasional SOB/wheeze  Rx sent today-albuterol inhaler  Dose/use reviewed  Discussed Claritin  Follow-up if symptoms persist in the next few weeks

## 2024-04-17 NOTE — ASSESSMENT & PLAN NOTE
Occasional low back pain   Very infrequent  Effectively treats with Skelaxin  Rx refilled today to use as needed  Continue gentle stretching, ice/heat  Recommend PT if symptoms start occurring more frequently

## 2024-04-17 NOTE — ASSESSMENT & PLAN NOTE
BP elevated today: 144/86  Reports home pressures similar  Compliant with losartan 100; amlodipine 5  Will increase amlodipine to 10 today  Continue monitoring home pressures  He will send me a message in the next few weeks with an update  Return to care sooner with problems or concerns  Follow-up with cardiology as scheduled  I will notify his cardiology NP of the dose change
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
## 2024-04-17 NOTE — ASSESSMENT & PLAN NOTE
Patient reports his recent lipid panel was nonfasting  Completed for employee wellness  Will recheck lipid panel today-advised a good 10 to 12-hour fast prior    Note patient was able to decrease his cholesterol by diet alone back in October.  Reports he has been eating more of a Mediterranean diet-limiting salt, increasing water  Will await repeat lipid results and forward to cardiology once received

## 2024-10-16 DIAGNOSIS — I10 PRIMARY HYPERTENSION: ICD-10-CM

## 2024-10-16 RX ORDER — LOSARTAN POTASSIUM 100 MG/1
100 TABLET ORAL DAILY
Qty: 90 TABLET | Refills: 0 | Status: SHIPPED | OUTPATIENT
Start: 2024-10-16

## 2024-10-18 NOTE — TELEPHONE ENCOUNTER
"Called patient to schedule f/u appt. Patient stated \"he is not sure if he is even going to take the medication anymore.  He made changes to get his BP down, he does not like taking medication and he feels the medication was affecting parts of his body.\" He said he gets his BP checked once a week, and he will have his wife call if he wants to follow up.  "

## 2024-12-20 ENCOUNTER — OFFICE VISIT (OUTPATIENT)
Dept: FAMILY MEDICINE CLINIC | Facility: CLINIC | Age: 59
End: 2024-12-20
Payer: COMMERCIAL

## 2024-12-20 ENCOUNTER — APPOINTMENT (OUTPATIENT)
Dept: LAB | Facility: CLINIC | Age: 59
End: 2024-12-20
Payer: COMMERCIAL

## 2024-12-20 ENCOUNTER — RESULTS FOLLOW-UP (OUTPATIENT)
Dept: FAMILY MEDICINE CLINIC | Facility: CLINIC | Age: 59
End: 2024-12-20

## 2024-12-20 VITALS
BODY MASS INDEX: 26.93 KG/M2 | HEART RATE: 67 BPM | OXYGEN SATURATION: 97 % | WEIGHT: 171.6 LBS | TEMPERATURE: 97.8 F | HEIGHT: 67 IN | SYSTOLIC BLOOD PRESSURE: 120 MMHG | DIASTOLIC BLOOD PRESSURE: 70 MMHG

## 2024-12-20 DIAGNOSIS — Z76.89 ENCOUNTER TO ESTABLISH CARE: ICD-10-CM

## 2024-12-20 DIAGNOSIS — Z12.2 SCREENING FOR LUNG CANCER: ICD-10-CM

## 2024-12-20 DIAGNOSIS — Z12.5 SCREENING FOR PROSTATE CANCER: ICD-10-CM

## 2024-12-20 DIAGNOSIS — E78.2 MIXED HYPERLIPIDEMIA: ICD-10-CM

## 2024-12-20 DIAGNOSIS — I10 PRIMARY HYPERTENSION: Primary | ICD-10-CM

## 2024-12-20 DIAGNOSIS — D22.9 MULTIPLE NEVI: ICD-10-CM

## 2024-12-20 DIAGNOSIS — Z87.891 HISTORY OF TOBACCO ABUSE: ICD-10-CM

## 2024-12-20 DIAGNOSIS — R73.03 PREDIABETES: ICD-10-CM

## 2024-12-20 DIAGNOSIS — Z13.0 SCREENING, ANEMIA, DEFICIENCY, IRON: ICD-10-CM

## 2024-12-20 DIAGNOSIS — Z13.29 SCREENING FOR THYROID DISORDER: ICD-10-CM

## 2024-12-20 DIAGNOSIS — K21.9 GASTROESOPHAGEAL REFLUX DISEASE, UNSPECIFIED WHETHER ESOPHAGITIS PRESENT: ICD-10-CM

## 2024-12-20 DIAGNOSIS — Z11.59 NEED FOR HEPATITIS C SCREENING TEST: ICD-10-CM

## 2024-12-20 LAB
ALBUMIN SERPL BCG-MCNC: 4.5 G/DL (ref 3.5–5)
ALP SERPL-CCNC: 62 U/L (ref 34–104)
ALT SERPL W P-5'-P-CCNC: 17 U/L (ref 7–52)
ANION GAP SERPL CALCULATED.3IONS-SCNC: 7 MMOL/L (ref 4–13)
AST SERPL W P-5'-P-CCNC: 24 U/L (ref 13–39)
BASOPHILS # BLD AUTO: 0.05 THOUSANDS/ΜL (ref 0–0.1)
BASOPHILS NFR BLD AUTO: 0 % (ref 0–1)
BILIRUB SERPL-MCNC: 0.51 MG/DL (ref 0.2–1)
BUN SERPL-MCNC: 15 MG/DL (ref 5–25)
CALCIUM SERPL-MCNC: 9.7 MG/DL (ref 8.4–10.2)
CHLORIDE SERPL-SCNC: 105 MMOL/L (ref 96–108)
CHOLEST SERPL-MCNC: 216 MG/DL (ref ?–200)
CO2 SERPL-SCNC: 26 MMOL/L (ref 21–32)
CREAT SERPL-MCNC: 0.93 MG/DL (ref 0.6–1.3)
EOSINOPHIL # BLD AUTO: 0.45 THOUSAND/ΜL (ref 0–0.61)
EOSINOPHIL NFR BLD AUTO: 4 % (ref 0–6)
ERYTHROCYTE [DISTWIDTH] IN BLOOD BY AUTOMATED COUNT: 13.5 % (ref 11.6–15.1)
EST. AVERAGE GLUCOSE BLD GHB EST-MCNC: 128 MG/DL
GFR SERPL CREATININE-BSD FRML MDRD: 89 ML/MIN/1.73SQ M
GLUCOSE P FAST SERPL-MCNC: 107 MG/DL (ref 65–99)
HBA1C MFR BLD: 6.1 %
HCT VFR BLD AUTO: 46.9 % (ref 36.5–49.3)
HCV AB SER QL: NORMAL
HDLC SERPL-MCNC: 60 MG/DL
HGB BLD-MCNC: 16 G/DL (ref 12–17)
IMM GRANULOCYTES # BLD AUTO: 0.02 THOUSAND/UL (ref 0–0.2)
IMM GRANULOCYTES NFR BLD AUTO: 0 % (ref 0–2)
LDLC SERPL CALC-MCNC: 140 MG/DL (ref 0–100)
LYMPHOCYTES # BLD AUTO: 2.95 THOUSANDS/ΜL (ref 0.6–4.47)
LYMPHOCYTES NFR BLD AUTO: 26 % (ref 14–44)
MCH RBC QN AUTO: 29.5 PG (ref 26.8–34.3)
MCHC RBC AUTO-ENTMCNC: 34.1 G/DL (ref 31.4–37.4)
MCV RBC AUTO: 87 FL (ref 82–98)
MONOCYTES # BLD AUTO: 0.86 THOUSAND/ΜL (ref 0.17–1.22)
MONOCYTES NFR BLD AUTO: 8 % (ref 4–12)
NEUTROPHILS # BLD AUTO: 6.88 THOUSANDS/ΜL (ref 1.85–7.62)
NEUTS SEG NFR BLD AUTO: 62 % (ref 43–75)
NONHDLC SERPL-MCNC: 156 MG/DL
NRBC BLD AUTO-RTO: 0 /100 WBCS
PLATELET # BLD AUTO: 331 THOUSANDS/UL (ref 149–390)
PMV BLD AUTO: 8.9 FL (ref 8.9–12.7)
POTASSIUM SERPL-SCNC: 3.8 MMOL/L (ref 3.5–5.3)
PROT SERPL-MCNC: 7.2 G/DL (ref 6.4–8.4)
PSA SERPL-MCNC: 0.33 NG/ML (ref 0–4)
RBC # BLD AUTO: 5.42 MILLION/UL (ref 3.88–5.62)
SODIUM SERPL-SCNC: 138 MMOL/L (ref 135–147)
TRIGL SERPL-MCNC: 82 MG/DL (ref ?–150)
TSH SERPL DL<=0.05 MIU/L-ACNC: 1.31 UIU/ML (ref 0.45–4.5)
WBC # BLD AUTO: 11.21 THOUSAND/UL (ref 4.31–10.16)

## 2024-12-20 PROCEDURE — 85025 COMPLETE CBC W/AUTO DIFF WBC: CPT

## 2024-12-20 PROCEDURE — 84443 ASSAY THYROID STIM HORMONE: CPT

## 2024-12-20 PROCEDURE — 80061 LIPID PANEL: CPT

## 2024-12-20 PROCEDURE — 86803 HEPATITIS C AB TEST: CPT

## 2024-12-20 PROCEDURE — 80053 COMPREHEN METABOLIC PANEL: CPT

## 2024-12-20 PROCEDURE — G0103 PSA SCREENING: HCPCS

## 2024-12-20 PROCEDURE — 83036 HEMOGLOBIN GLYCOSYLATED A1C: CPT

## 2024-12-20 PROCEDURE — 36415 COLL VENOUS BLD VENIPUNCTURE: CPT

## 2024-12-20 PROCEDURE — 99214 OFFICE O/P EST MOD 30 MIN: CPT | Performed by: NURSE PRACTITIONER

## 2024-12-20 NOTE — ASSESSMENT & PLAN NOTE
Initial /CM Assessment/Plan of Care Note     Baseline Assessment  53 year old admitted 10/16/2022 as Inpatient with a diagnosis of fluid overload.   Prior to admission patient was living with Alone and residing at [facility].  Patient does have a Power of  for Healthcare.  Document is activated.  Agent is mother, Kelly Mckee.  Patient’s Primary Care Provider is Isaiah Bridges MD.     Medical History  Past Medical History:   Diagnosis Date   • Anemia    • Anxiety    • Chronic kidney disease    • Chronic kidney disease with end stage renal failure on dialysis (CMS/[facility])    • Chronic respiratory failure (CMS/[facility])    • Congestive cardiac failure (CMS/[facility])    • Depression    • Diabetes mellitus (CMS/[facility])    • Essential (primary) hypertension    • Falls    • Gastroesophageal reflux disease    • High cholesterol    • Morbid obesity (CMS/[facility])    • Retention, urine    • Schizophrenia (CMS/[facility])    • Sleep apnea    • Tracheostomy in place (CMS/[facility])     Initial trach placed September 9, 2018       Prior to Admission Status  Functional Status  Ambulation: Other (comment) (Bedbound)  Bathing: Facility Staff  Dressing: Facility Staff  Toileting: Facility Staff  Meal Preparation: Facility Staff  Medication Preparation: Pharmacy Setup  Medication Administration: Staff Administered    Agency/Support  Type of Services Prior to Hospitalization: None  Support Systems: Parent  Home Devices/Equipment: Hospital bed, Home Oxygen (T), Trach or stoma supplies (T), Mobility assist device (Hemodialysis)  Mobility Assist Devices: Transfer mat/pad/sheet, Total lift     Current Status  Current Mental Status: Lethargic     Insurance  Primary: [employer]  Secondary: [place] [employer]ICA[employer]    Barriers to Discharge  Identified Barriers to Discharge/Transition Planning: clinical status    Progress Note  Pt admitted from [facility] w/fluid overload.     Hx: intellectual disability, chronic resp failure s/p  Last lipid panel elevated in April  Total 290; Jose 299; ; HDL 49  He admits to being off all of his cholesterol medication-self stopped several months ago  We will recheck lipid panel now  Orders:    Lipid panel; Future     tracheostomy-HHTC, ESRD, HD dependent, obesity alveolar hypoventilation, CHF, CKD, HTN, DM, Schizoaffective Disorder.     Met w/pt's mother/MARSHAL Kelly who stated that although she is in agreement w/return to [facility], she shared that the facility was considering transfer to [facility] where pt could receive HD w/frequency of 5 sessions/week, referred to SW.     Plan  SW/CM - Recommendations for Discharge: Return to [facility] vs [facility]    Refer to SW/CM Flowsheet for Goals and objective data.

## 2024-12-20 NOTE — ASSESSMENT & PLAN NOTE
BP well-controlled: 120/70  Continue amlodipine 10; losartan 100  Advised checking periodic home pressures  Follow-up 6 months-annual physical  Return sooner with problems or concerns

## 2024-12-20 NOTE — ASSESSMENT & PLAN NOTE
A1c 6.3 in April  Patient has been making significant lifestyle change: Diet/exercise  We will recheck A1c now    Orders:    Comprehensive metabolic panel; Future    Hemoglobin A1C; Future

## 2024-12-20 NOTE — PROGRESS NOTES
Name: Hadley Lake      : 1965      MRN: [id number]  Encounter Provider: CARLA Mora  Encounter Date: 2024   Encounter department: [facility]  :  Assessment & Plan  Primary hypertension  BP well-controlled: 120/70  Continue amlodipine 10; losartan 100  Advised checking periodic home pressures  Follow-up 6 months-annual physical  Return sooner with problems or concerns         Gastroesophageal reflux disease, unspecified whether esophagitis present  Stable off medication: Diet controlled       History of tobacco abuse  Remains smoke-free-almost 2 years  Check l screening lung CT       Prediabetes  A1c 6.3 in April  Patient has been making significant lifestyle change: Diet/exercise  We will recheck A1c now    Orders:    Comprehensive metabolic panel; Future    Hemoglobin A1C; Future    Mixed hyperlipidemia  Last lipid panel elevated in April  Total 290; Jose 299; ; HDL 49  He admits to being off all of his cholesterol medication-self stopped several months ago  We will recheck lipid panel now  Orders:    Lipid panel; Future    Screening for thyroid disorder    Orders:    TSH, 3rd generation with Free T4 reflex; Future    Screening, anemia, deficiency, iron    Orders:    CBC and differential; Future    Screening for prostate cancer    Orders:    PSA, Total Screen; Future    Need for hepatitis C screening test    Orders:    Hepatitis C Antibody; Future    Screening for lung cancer  I discussed with him that he is a candidate for lung cancer CT screening.     The following Shared Decision-Making points were covered:  Benefits of screening were discussed, including the rates of reduction in death from lung cancer and other causes.  Harms of screening were reviewed, including false positive tests, radiation exposure levels, risks of invasive procedures, risks of complications of screening, and risk of overdiagnosis.  I counseled on the importance of  adherence to annual lung cancer LDCT screening, impact of co-morbidities, and ability or willingness to undergo diagnosis and treatment.  I counseled on the importance of maintaining abstinence as a former smoker or was counseled on the importance of smoking cessation if a current smoker    Review of Eligibility Criteria: He meets all of the criteria for Lung Cancer Screening.   He is 59 y.o.   He has 20 pack year tobacco history and is a current smoker or has quit within the past 15 years  He presents no signs or symptoms of lung cancer    After discussion, the patient decided to elect lung cancer screening.    Orders:    CT lung screening program; Future    Multiple nevi    Orders:    Ambulatory Referral to Dermatology; Future    Encounter to establish care    Orders:    Ambulatory Referral to Dermatology; Future    Preventative : due for Colonoscopy 2028      Lung Cancer Screening Shared Decision Making: I discussed with him that he is a candidate for lung cancer CT screening.     The following Shared Decision-Making points were covered:  Benefits of screening were discussed, including the rates of reduction in death from lung cancer and other causes.  Harms of screening were reviewed, including false positive tests, radiation exposure levels, risks of invasive procedures, risks of complications of screening, and risk of overdiagnosis.  I counseled on the importance of adherence to annual lung cancer LDCT screening, impact of co-morbidities, and ability or willingness to undergo diagnosis and treatment.  I counseled on the importance of maintaining abstinence as a former smoker or was counseled on the importance of smoking cessation if a current smoker    Review of Eligibility Criteria: He meets all of the criteria for Lung Cancer Screening.   - He is 59 y.o.   - He has 20 pack year tobacco history and is a current smoker or has quit within the past 15 years  - He presents no signs or symptoms of lung  "cancer    After discussion, the patient decided to elect lung cancer screening.      History of Present Illness     Routine check  No new health concerns  Now seeing psychologist: Dr. Ahuja  Talk therapy for stress/anxiety  Feels well from mental health standpoint      Review of Systems   Constitutional: Negative.    HENT: Negative.     Respiratory: Negative.     Cardiovascular: Negative.    Gastrointestinal: Negative.    Genitourinary: Negative.    Musculoskeletal: Negative.    Skin: Negative.         Few moles   Neurological: Negative.    Psychiatric/Behavioral: Negative.         Objective   /70 (BP Location: Left arm, Patient Position: Sitting, Cuff Size: Adult)   Pulse 67   Temp 97.8 °F (36.6 °C)   Ht 5' 7\" (1.702 m)   Wt 77.8 kg (171 lb 9.6 oz)   SpO2 97%   BMI 26.88 kg/m²      Physical Exam  Vitals and nursing note reviewed.   Constitutional:       General: He is not in acute distress.     Appearance: Normal appearance. He is well-developed. He is not ill-appearing.   Neck:      Vascular: No carotid bruit.   Cardiovascular:      Rate and Rhythm: Normal rate and regular rhythm.      Heart sounds: Normal heart sounds.   Pulmonary:      Effort: Pulmonary effort is normal. No respiratory distress.      Breath sounds: Normal breath sounds and air entry.   Musculoskeletal:      Right lower leg: No edema.      Left lower leg: No edema.   Skin:     General: Skin is warm and dry.      Comments: few scattered nevi - normal appearance  Referral for routine skin checks with Derm   Neurological:      Mental Status: He is alert and oriented to person, place, and time.   Psychiatric:         Attention and Perception: Attention normal.         Mood and Affect: Mood normal.         Behavior: Behavior normal.         Thought Content: Thought content normal.         Judgment: Judgment normal.         "

## 2024-12-28 DIAGNOSIS — I10 PRIMARY HYPERTENSION: ICD-10-CM

## 2024-12-28 RX ORDER — AMLODIPINE BESYLATE 10 MG/1
10 TABLET ORAL DAILY
Qty: 90 TABLET | Refills: 1 | Status: SHIPPED | OUTPATIENT
Start: 2024-12-28

## 2024-12-30 DIAGNOSIS — I10 PRIMARY HYPERTENSION: ICD-10-CM

## 2024-12-30 DIAGNOSIS — R06.2 WHEEZING: ICD-10-CM

## 2024-12-31 RX ORDER — AMLODIPINE BESYLATE 10 MG/1
10 TABLET ORAL DAILY
Qty: 90 TABLET | Refills: 0 | OUTPATIENT
Start: 2024-12-31

## 2024-12-31 RX ORDER — ALBUTEROL SULFATE 90 UG/1
AEROSOL, METERED RESPIRATORY (INHALATION)
Qty: 36 G | Refills: 1 | Status: SHIPPED | OUTPATIENT
Start: 2024-12-31

## 2025-01-03 ENCOUNTER — OFFICE VISIT (OUTPATIENT)
Age: 60
End: 2025-01-03
Payer: COMMERCIAL

## 2025-01-03 VITALS
TEMPERATURE: 98.7 F | SYSTOLIC BLOOD PRESSURE: 158 MMHG | HEIGHT: 67 IN | HEART RATE: 73 BPM | BODY MASS INDEX: 25.71 KG/M2 | WEIGHT: 163.8 LBS | RESPIRATION RATE: 22 BRPM | OXYGEN SATURATION: 99 % | DIASTOLIC BLOOD PRESSURE: 68 MMHG

## 2025-01-03 DIAGNOSIS — R11.0 NAUSEA: ICD-10-CM

## 2025-01-03 DIAGNOSIS — R42 DIZZINESS AND GIDDINESS: ICD-10-CM

## 2025-01-03 DIAGNOSIS — R06.02 SHORTNESS OF BREATH: Primary | ICD-10-CM

## 2025-01-03 DIAGNOSIS — R04.2 HEMOPTYSIS: ICD-10-CM

## 2025-01-03 PROCEDURE — 99215 OFFICE O/P EST HI 40 MIN: CPT | Performed by: PHYSICIAN ASSISTANT

## 2025-01-03 PROCEDURE — 93005 ELECTROCARDIOGRAM TRACING: CPT | Performed by: PHYSICIAN ASSISTANT

## 2025-01-03 NOTE — PROGRESS NOTES
[facility]        NAME: Hadley Lake is a 59 y.o. male  : 1965    MRN: [id number]  DATE: January 3, 2025  TIME: 8:29 AM    Assessment and Plan   No primary diagnosis found.  No diagnosis found.      Patient Instructions       Follow up with PCP in 3-5 days.  Proceed to  ER if symptoms worsen.    If tests have been performed at [facility], our office will contact you with results if changes need to be made to the care plan discussed with you at the visit.  You can review your full results on [facility].    Chief Complaint     Chief Complaint   Patient presents with    Shortness of Breath         History of Present Illness       HPI    Review of Systems   Review of Systems      Current Medications       Current Outpatient Medications:     albuterol (Ventolin HFA) 90 mcg/act inhaler, Inhale 2 puffs by mouth every 6 (six) hours as needed for wheezing, Disp: 36 g, Rfl: 1    amLODIPine (NORVASC) 10 mg tablet, Take 1 tablet (10 mg total) by mouth daily, Disp: 90 tablet, Rfl: 1    atorvastatin (LIPITOR) 40 mg tablet, Take 1 tablet (40 mg total) by mouth daily (Patient not taking: Reported on 2024), Disp: 90 tablet, Rfl: 1    Bempedoic Acid 180 MG TABS, Take 1 tablet by mouth daily at bedtime (Patient not taking: Reported on 2024), Disp: 90 tablet, Rfl: 3    losartan (COZAAR) 100 MG tablet, TAKE ONE TABLET BY MOUTH EVERY DAY, Disp: 90 tablet, Rfl: 0    metaxalone (SKELAXIN) 400 MG tablet, Take 1 tablet (400 mg total) by mouth 3 (three) times a day as needed (muscle spasm - low back) (Patient not taking: Reported on 2024), Disp: 10 tablet, Rfl: 0    pantoprazole (PROTONIX) 40 mg tablet, TAKE ONE TABLET BY MOUTH EVERY DAY (Patient not taking: Reported on 2024), Disp: 90 tablet, Rfl: 0    Current Allergies     Allergies as of 2025    (No Known Allergies)            The following portions of the patient's history were reviewed and updated as appropriate: allergies,  "current medications, past family history, past medical history, past social history, past surgical history and problem list.     Past Medical History:   Diagnosis Date    Cardiomyopathy (HCC)     Unclear details    History of angioplasty     Unclear details    HTN (hypertension)     diagnosed 2020    Hyperlipidemia     Rheumatic fever 1969    Tobacco use        Past Surgical History:   Procedure Laterality Date    ANGIOPLASTY      age 40    DENTAL SURGERY  04/2023    KNEE ARTHROSCOPY Left     teenager       Family History   Problem Relation Age of Onset    Cancer Mother     Alcohol abuse Father 58    Hypertension Father     Alcohol abuse Brother     Drug abuse Brother     Lung disease Maternal Grandmother     Lung disease Maternal Grandfather     Lung disease Paternal Grandfather          Medications have been verified.        Objective   /68 (Patient Position: Sitting)   Pulse 73   Temp 98.7 °F (37.1 °C) (Tympanic)   Resp 22   Ht 5' 7\" (1.702 m)   Wt 74.3 kg (163 lb 12.8 oz)   SpO2 99%   BMI 25.65 kg/m²   No LMP for male patient.       Physical Exam     Physical Exam              " "ANGIOPLASTY      age 40    DENTAL SURGERY  04/2023    KNEE ARTHROSCOPY Left     teenager       Family History   Problem Relation Age of Onset    Cancer Mother     Alcohol abuse Father 58    Hypertension Father     Alcohol abuse Brother     Drug abuse Brother     Lung disease Maternal Grandmother     Lung disease Maternal Grandfather     Lung disease Paternal Grandfather          Medications have been verified.        Objective   /68 (Patient Position: Sitting)   Pulse 73   Temp 98.7 °F (37.1 °C) (Tympanic)   Resp 22   Ht 5' 7\" (1.702 m)   Wt 74.3 kg (163 lb 12.8 oz)   SpO2 99%   BMI 25.65 kg/m²   No LMP for male patient.       Physical Exam     Physical Exam  Vitals reviewed.   Constitutional:       General: He is in acute distress.      Appearance: He is well-developed. He is ill-appearing.   HENT:      Mouth/Throat:      Mouth: Mucous membranes are moist.      Pharynx: Oropharynx is clear.   Cardiovascular:      Rate and Rhythm: Normal rate and regular rhythm.      Heart sounds: Normal heart sounds. No murmur heard.  Pulmonary:      Effort: Tachypnea and respiratory distress present.      Breath sounds: Normal breath sounds.   Musculoskeletal:      Cervical back: Neck supple.   Lymphadenopathy:      Cervical: No cervical adenopathy.   Neurological:      Mental Status: He is alert and oriented to person, place, and time.                   "

## 2025-01-06 ENCOUNTER — TELEPHONE (OUTPATIENT)
Age: 60
End: 2025-01-06

## 2025-01-06 LAB
ATRIAL RATE: 69 BPM
P AXIS: 90 DEGREES
PR INTERVAL: 150 MS
QRS AXIS: 82 DEGREES
QRSD INTERVAL: 106 MS
QT INTERVAL: 410 MS
QTC INTERVAL: 439 MS
T WAVE AXIS: 79 DEGREES
VENTRICULAR RATE: 69 BPM

## 2025-01-06 NOTE — TELEPHONE ENCOUNTER
Patient called requesting to review ECG  from Urgent care on 1/3/25.  Provider has not commented on results at this time.    Please have provider review results and follow up with patient.

## 2025-01-07 ENCOUNTER — TRANSITIONAL CARE MANAGEMENT (OUTPATIENT)
Dept: FAMILY MEDICINE CLINIC | Facility: CLINIC | Age: 60
End: 2025-01-07

## 2025-01-07 NOTE — TELEPHONE ENCOUNTER
Not a tcm, patient was only in ER at [facility].  Appointment scheduled for 1/17/25 with Deepthi.

## 2025-01-17 ENCOUNTER — OFFICE VISIT (OUTPATIENT)
Dept: FAMILY MEDICINE CLINIC | Facility: CLINIC | Age: 60
End: 2025-01-17
Payer: COMMERCIAL

## 2025-01-17 ENCOUNTER — RESULTS FOLLOW-UP (OUTPATIENT)
Dept: FAMILY MEDICINE CLINIC | Facility: CLINIC | Age: 60
End: 2025-01-17

## 2025-01-17 ENCOUNTER — APPOINTMENT (OUTPATIENT)
Dept: LAB | Facility: CLINIC | Age: 60
End: 2025-01-17
Payer: COMMERCIAL

## 2025-01-17 ENCOUNTER — APPOINTMENT (OUTPATIENT)
Dept: RADIOLOGY | Facility: CLINIC | Age: 60
End: 2025-01-17
Payer: COMMERCIAL

## 2025-01-17 VITALS
DIASTOLIC BLOOD PRESSURE: 80 MMHG | SYSTOLIC BLOOD PRESSURE: 120 MMHG | HEART RATE: 78 BPM | OXYGEN SATURATION: 98 % | BODY MASS INDEX: 26.9 KG/M2 | HEIGHT: 67 IN | TEMPERATURE: 97.7 F | WEIGHT: 171.4 LBS

## 2025-01-17 DIAGNOSIS — R07.9 CHEST PAIN, UNSPECIFIED TYPE: Primary | ICD-10-CM

## 2025-01-17 DIAGNOSIS — R07.9 CHEST PAIN, UNSPECIFIED TYPE: ICD-10-CM

## 2025-01-17 LAB
ALBUMIN SERPL BCG-MCNC: 4.3 G/DL (ref 3.5–5)
ALP SERPL-CCNC: 71 U/L (ref 34–104)
ALT SERPL W P-5'-P-CCNC: 18 U/L (ref 7–52)
ANION GAP SERPL CALCULATED.3IONS-SCNC: 10 MMOL/L (ref 4–13)
AST SERPL W P-5'-P-CCNC: 20 U/L (ref 13–39)
BASOPHILS # BLD AUTO: 0.06 THOUSANDS/ΜL (ref 0–0.1)
BASOPHILS NFR BLD AUTO: 1 % (ref 0–1)
BILIRUB SERPL-MCNC: 0.59 MG/DL (ref 0.2–1)
BUN SERPL-MCNC: 13 MG/DL (ref 5–25)
CALCIUM SERPL-MCNC: 9.7 MG/DL (ref 8.4–10.2)
CHLORIDE SERPL-SCNC: 100 MMOL/L (ref 96–108)
CO2 SERPL-SCNC: 27 MMOL/L (ref 21–32)
CREAT SERPL-MCNC: 0.96 MG/DL (ref 0.6–1.3)
EOSINOPHIL # BLD AUTO: 0.33 THOUSAND/ΜL (ref 0–0.61)
EOSINOPHIL NFR BLD AUTO: 3 % (ref 0–6)
ERYTHROCYTE [DISTWIDTH] IN BLOOD BY AUTOMATED COUNT: 13.7 % (ref 11.6–15.1)
GFR SERPL CREATININE-BSD FRML MDRD: 86 ML/MIN/1.73SQ M
GLUCOSE P FAST SERPL-MCNC: 95 MG/DL (ref 65–99)
HCT VFR BLD AUTO: 44.8 % (ref 36.5–49.3)
HGB BLD-MCNC: 15.2 G/DL (ref 12–17)
IMM GRANULOCYTES # BLD AUTO: 0.02 THOUSAND/UL (ref 0–0.2)
IMM GRANULOCYTES NFR BLD AUTO: 0 % (ref 0–2)
LYMPHOCYTES # BLD AUTO: 3.68 THOUSANDS/ΜL (ref 0.6–4.47)
LYMPHOCYTES NFR BLD AUTO: 36 % (ref 14–44)
MCH RBC QN AUTO: 29.6 PG (ref 26.8–34.3)
MCHC RBC AUTO-ENTMCNC: 33.9 G/DL (ref 31.4–37.4)
MCV RBC AUTO: 87 FL (ref 82–98)
MONOCYTES # BLD AUTO: 0.87 THOUSAND/ΜL (ref 0.17–1.22)
MONOCYTES NFR BLD AUTO: 9 % (ref 4–12)
NEUTROPHILS # BLD AUTO: 5.26 THOUSANDS/ΜL (ref 1.85–7.62)
NEUTS SEG NFR BLD AUTO: 51 % (ref 43–75)
NRBC BLD AUTO-RTO: 0 /100 WBCS
PLATELET # BLD AUTO: 420 THOUSANDS/UL (ref 149–390)
PMV BLD AUTO: 8.8 FL (ref 8.9–12.7)
POTASSIUM SERPL-SCNC: 4 MMOL/L (ref 3.5–5.3)
PROT SERPL-MCNC: 7.2 G/DL (ref 6.4–8.4)
RBC # BLD AUTO: 5.13 MILLION/UL (ref 3.88–5.62)
SODIUM SERPL-SCNC: 137 MMOL/L (ref 135–147)
WBC # BLD AUTO: 10.22 THOUSAND/UL (ref 4.31–10.16)

## 2025-01-17 PROCEDURE — 71046 X-RAY EXAM CHEST 2 VIEWS: CPT

## 2025-01-17 PROCEDURE — 80053 COMPREHEN METABOLIC PANEL: CPT

## 2025-01-17 PROCEDURE — 99214 OFFICE O/P EST MOD 30 MIN: CPT | Performed by: NURSE PRACTITIONER

## 2025-01-17 PROCEDURE — 36415 COLL VENOUS BLD VENIPUNCTURE: CPT

## 2025-01-17 PROCEDURE — 85025 COMPLETE CBC W/AUTO DIFF WBC: CPT

## 2025-01-17 RX ORDER — ONDANSETRON 4 MG/1
TABLET, ORALLY DISINTEGRATING ORAL
COMMUNITY
Start: 2025-01-03

## 2025-01-17 RX ORDER — BENZONATATE 200 MG/1
CAPSULE ORAL
COMMUNITY
Start: 2025-01-03

## 2025-01-17 NOTE — PROGRESS NOTES
Name: Hadley Lake      : 1965      MRN: [id number]  Encounter Provider: CARLA Mora  Encounter Date: 2025   Encounter department: [facility]  :  Assessment & Plan  Chest pain, unspecified type  Physical assessment unremarkable today  Currently asymptomatic  Workup plan:  Check labs: CBC; CMP (note elevated white count end of December-likely due to evolving GI at that time-will check to ensure white count has normalized)  Chest x-ray-rule out pulmonary cause  Lung CT screen still pending in system (tobacco history) patient will call to schedule.  Cardiac - NM stress test  Discussed will refer back to cardiology (last seen ; Stress ECHO at that time)  Strict ER precautions with further symptoms    Orders:    CBC and differential; Future    Comprehensive metabolic panel; Future    NM myocardial perfusion spect (stress and/or rest); Future    XR chest pa and lateral; Future           History of Present Illness     ER follow up  Seen at urgent care 1/3 with URI/GI symptoms. Sent to ER from there. Was treated at [facility] for presumed norovirus.  Reports symptoms had been present for about 1.5 weeks prior to being seen.  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  Reported flu-like symptoms, SOB, dizziness, hemotptysis, nausea, back pain, chest and sinus congestion, abd pain and bloody diarrhea.  Reports today that his symptoms have completely resolved.  He is back to his baseline.  Tolerating a diet.  No further diarrhea.  Reports having a chest x-ray done at [facility], and was advised to have a CT scan.  Reports he does get occasional chest pain-primarily on the left chest.  No specific triggers.  Unsure if cardiac or pulmonary. Reports noting occasional wheeze. Occurs spontaneously; resolved spontaneous.  He has seen cardiology in the past-last in .  He did have a stress echo which was unremarkable.  He does have a significant smoking  Pulmonary Progress Note     Assessment / Plan:  1. Dyspnea - possible asthma exacerbation vs VCD. She has no wheezing on exam. Her rapid COVID test neg,  test is pending. PCT neg, doubt bacterial infection.   -O2 as needed - currently on room air  -f/u "history, quitting about 2 years ago.      Review of Systems   Constitutional: Negative.  Negative for activity change and appetite change.   HENT: Negative.     Respiratory:  Positive for chest tightness.    Cardiovascular:  Positive for chest pain. Negative for palpitations and leg swelling.   Gastrointestinal: Negative.  Negative for abdominal pain, blood in stool and diarrhea.   Neurological: Negative.    Psychiatric/Behavioral: Negative.         Objective   /80 (BP Location: Left arm, Patient Position: Sitting, Cuff Size: Adult)   Pulse 78   Temp 97.7 °F (36.5 °C)   Ht 5' 7\" (1.702 m)   Wt 77.7 kg (171 lb 6.4 oz)   SpO2 98%   BMI 26.85 kg/m²      Physical Exam  Vitals and nursing note reviewed.   Constitutional:       General: He is not in acute distress.     Appearance: Normal appearance. He is well-developed. He is not ill-appearing.   Cardiovascular:      Rate and Rhythm: Normal rate and regular rhythm.      Heart sounds: Normal heart sounds.   Pulmonary:      Effort: Pulmonary effort is normal. No respiratory distress.      Breath sounds: Normal air entry. Examination of the right-lower field reveals wheezing. Wheezing present. No rhonchi or rales.   Musculoskeletal:      Right lower leg: No edema.      Left lower leg: No edema.   Skin:     General: Skin is warm and dry.   Neurological:      General: No focal deficit present.      Mental Status: He is alert and oriented to person, place, and time.   Psychiatric:         Attention and Perception: Attention normal.         Mood and Affect: Mood normal.         Behavior: Behavior normal.         Thought Content: Thought content normal.         Judgment: Judgment normal.         " posteriorly   Abdomen: soft, nontender, nondistended   Extremities: no edema or cyanosis  Psych: interactive, answering questions appropriately, appropriate affect    Medications:  Reviewed in EMR    Lab Data:  Reviewed in EMR    Imaging:  I independently

## 2025-01-28 ENCOUNTER — TELEPHONE (OUTPATIENT)
Age: 60
End: 2025-01-28

## 2025-01-28 DIAGNOSIS — I10 PRIMARY HYPERTENSION: Primary | ICD-10-CM

## 2025-01-28 DIAGNOSIS — Z86.79 HISTORY OF RHEUMATIC FEVER: ICD-10-CM

## 2025-01-28 DIAGNOSIS — R07.2 PRECORDIAL PAIN: ICD-10-CM

## 2025-01-28 DIAGNOSIS — R07.9 CHEST PAIN, UNSPECIFIED TYPE: ICD-10-CM

## 2025-01-30 ENCOUNTER — APPOINTMENT (OUTPATIENT)
Dept: NUCLEAR MEDICINE | Facility: HOSPITAL | Age: 60
End: 2025-01-30
Payer: COMMERCIAL

## 2025-01-30 ENCOUNTER — TELEPHONE (OUTPATIENT)
Age: 60
End: 2025-01-30

## 2025-01-30 ENCOUNTER — APPOINTMENT (OUTPATIENT)
Dept: NON INVASIVE DIAGNOSTICS | Facility: HOSPITAL | Age: 60
End: 2025-01-30
Payer: COMMERCIAL

## 2025-01-30 ENCOUNTER — HOSPITAL ENCOUNTER (OUTPATIENT)
Dept: CT IMAGING | Facility: HOSPITAL | Age: 60
Discharge: HOME | End: 2025-01-30

## 2025-01-30 DIAGNOSIS — Z12.2 SCREENING FOR LUNG CANCER: ICD-10-CM

## 2025-01-30 NOTE — TELEPHONE ENCOUNTER
Patient calling in response to lipid panel results. He reviewed on MyChart and is interested in restarting cholesterol medication. Please advise and reach out to patient with further details. He thanks us for our help!    Result Care Coordination      Patient Communication     Released  Seen Back to Top    Carlos Orellana  Your cholesterol is elevated again.  As you have been eating healthy, I do not think further dietary changes will offer any more improvement.  I think you would benefit from restarting the cholesterol medication.   Although still in prediabetic range, your A1c has improved.  I would like to continue monitoring this lab work every 3 months to ensure it remains stable.  If you are agreeable to the cholesterol medication, I would recommend rechecking your lipid panel in 3 months as well.  Let me know your thoughts  Deepthi   Written by CARLA Mora on 12/26/2024  6:00 PM EST  Seen by patient [place] Lake on 1/30/2025 11:10 AM

## 2025-01-30 NOTE — TELEPHONE ENCOUNTER
Patient called in and had questions on his labs - called over to the nurses line no answer - called over to the office. Patient disconnected the call or it was dropped.

## 2025-01-31 DIAGNOSIS — E78.2 MIXED HYPERLIPIDEMIA: ICD-10-CM

## 2025-01-31 RX ORDER — ATORVASTATIN CALCIUM 40 MG/1
40 TABLET, FILM COATED ORAL DAILY
Qty: 90 TABLET | Refills: 1 | Status: SHIPPED | OUTPATIENT
Start: 2025-01-31

## 2025-01-31 NOTE — TELEPHONE ENCOUNTER
Please call patient.  I sent his atorvastatin to [facility].  He should resume this nightly-after dinner.  And we can plan to recheck his lipid panel again in 3 months to assess for effectiveness.  Let me know if he has any questions or concerns.

## 2025-02-13 ENCOUNTER — TELEPHONE (OUTPATIENT)
Age: 60
End: 2025-02-13

## 2025-02-13 NOTE — TELEPHONE ENCOUNTER
"Patient calls due to the statin medication. Patient states that the medication causes him to have coughing. Patient states that the coughing comes in \"fits\" and causes him to feel SOB and to have discomfort in his chest. Patient wanted the PCP to know. Patient states that he has an appointment on 3/7/25. Patient also feels that he has been eating too much dairy and cheese and has stopped eating those foods. Patient will continue his Mediterranean diet and will follow up with you on 3/7/25. Patient is open to any other medication but not a statin. Patient can be reached at [phone]  "

## 2025-02-19 DIAGNOSIS — I10 PRIMARY HYPERTENSION: ICD-10-CM

## 2025-02-20 RX ORDER — LOSARTAN POTASSIUM 100 MG/1
100 TABLET ORAL DAILY
Qty: 90 TABLET | Refills: 0 | Status: SHIPPED | OUTPATIENT
Start: 2025-02-20

## 2025-04-22 ENCOUNTER — OFFICE VISIT (OUTPATIENT)
Dept: FAMILY MEDICINE CLINIC | Facility: CLINIC | Age: 60
End: 2025-04-22
Payer: COMMERCIAL

## 2025-04-22 VITALS
TEMPERATURE: 97.9 F | SYSTOLIC BLOOD PRESSURE: 136 MMHG | DIASTOLIC BLOOD PRESSURE: 72 MMHG | HEIGHT: 69 IN | WEIGHT: 174 LBS | HEART RATE: 82 BPM | BODY MASS INDEX: 25.77 KG/M2 | OXYGEN SATURATION: 98 %

## 2025-04-22 DIAGNOSIS — R06.2 WHEEZING: ICD-10-CM

## 2025-04-22 DIAGNOSIS — E78.2 MIXED HYPERLIPIDEMIA: ICD-10-CM

## 2025-04-22 DIAGNOSIS — I10 PRIMARY HYPERTENSION: ICD-10-CM

## 2025-04-22 DIAGNOSIS — R73.03 PREDIABETES: ICD-10-CM

## 2025-04-22 DIAGNOSIS — R06.09 DOE (DYSPNEA ON EXERTION): Primary | ICD-10-CM

## 2025-04-22 PROCEDURE — 99214 OFFICE O/P EST MOD 30 MIN: CPT | Performed by: FAMILY MEDICINE

## 2025-04-22 RX ORDER — FLUTICASONE PROPIONATE AND SALMETEROL 100; 50 UG/1; UG/1
1 POWDER RESPIRATORY (INHALATION) 2 TIMES DAILY
Qty: 60 BLISTER | Refills: 5 | Status: SHIPPED | OUTPATIENT
Start: 2025-04-22 | End: 2025-10-19

## 2025-04-22 NOTE — PROGRESS NOTES
Name: Hadley Lake      : 1965      MRN: [id number]  Encounter Provider: Adri Ferraro DO  Encounter Date: 2025   Encounter department: [facility]  :  Assessment & Plan  DE LEON (dyspnea on exertion)  Reviewed this performed over with the patient today.  Reviewed the differential possible causes as well.  He has had previous testing with any echo stress test in .  This test appeared clinically stable.  His recent CT of his chest appeared clinically stable.  He did have a few scattered nodules.  At this time, will check BNP to further rule out a cardiac problem such as congestive heart failure.  He may highly benefit from completing a pulmonary function test.  Order was placed for him.  Reviewed different treatment options.  Will continue with his albuterol.  Will start treatment with a long-term controller inhaler such as Advair.  If any symptoms should worsen, he is vies to call or follow-up.  Orders:    B-Type Natriuretic Peptide(BNP); Future    Complete PFT with post bronchodilator; Future    Fluticasone-Salmeterol (Advair) 100-50 mcg/dose inhaler; Inhale 1 puff 2 (two) times a day Rinse mouth after use.    Primary hypertension  Blood pressure appears clinically stable today.  Continue with his current treatment with losartan as well as amlodipine.  Orders:    Comprehensive metabolic panel; Future    Mixed hyperlipidemia  Patient's lipid panel has been previously elevated.  He was prescribed atorvastatin however he did not take this medication.  He states that statin medications appeared to cause significant sedation/fatigue for patient.  Reviewed previous evaluation from cardiology.  It appears that he was placed on bempedoic acid however patient does not recall ever picking up this medication.  If this is covered by his insurance, he would highly benefit from restarting this medication.  Will check lipid panel first as he has been working on a strict diet and  "exercise.  If this medication is not covered and his cholesterol is still elevated, he will likely need to see cardiology again to further evaluate potential use of a PCSK9 inhibitor    Orders:    Lipid Panel with Direct LDL reflex; Future    Prediabetes    Orders:    Hemoglobin A1C; Future           History of Present Illness   HPI  Patient is a 59-year-old male presents today for a follow-up from his last visit.  He states that he still been having persistent problems with dyspnea on exertion.  He had a CT completed however his nuclear stress test was denied by insurance.  He states that his respiratory symptoms are not significant today.  He has been using his albuterol periodically for symptom relief.  Review of Systems   Constitutional:  Negative for activity change, chills, fatigue and fever.   HENT:  Negative for congestion, ear pain, sinus pressure and sore throat.    Eyes:  Negative for redness, itching and visual disturbance.   Respiratory:  Positive for shortness of breath. Negative for cough.    Cardiovascular:  Negative for chest pain and palpitations.   Gastrointestinal:  Negative for abdominal pain, diarrhea and nausea.   Endocrine: Negative for cold intolerance and heat intolerance.   Genitourinary:  Negative for dysuria, flank pain and frequency.   Musculoskeletal:  Negative for arthralgias, back pain, gait problem and myalgias.   Skin:  Negative for color change.   Allergic/Immunologic: Negative for environmental allergies.   Neurological:  Negative for dizziness, numbness and headaches.   Psychiatric/Behavioral:  Negative for behavioral problems and sleep disturbance. The patient is nervous/anxious.        Objective   /88 (BP Location: Left arm, Patient Position: Sitting, Cuff Size: Standard)   Pulse 82   Temp 97.9 °F (36.6 °C) (Temporal)   Ht 5' 9\" (1.753 m)   Wt 78.9 kg (174 lb)   SpO2 98%   BMI 25.70 kg/m²      Physical Exam  Vitals reviewed.   Constitutional:       General: He is " not in acute distress.     Appearance: Normal appearance. He is well-developed.   HENT:      Head: Normocephalic and atraumatic.      Right Ear: Tympanic membrane, ear canal and external ear normal. There is no impacted cerumen.      Left Ear: Tympanic membrane, ear canal and external ear normal. There is no impacted cerumen.      Nose: Nose normal. No congestion or rhinorrhea.      Mouth/Throat:      Mouth: Mucous membranes are moist.      Pharynx: No oropharyngeal exudate or posterior oropharyngeal erythema.   Eyes:      General: No scleral icterus.        Right eye: No discharge.         Left eye: No discharge.      Extraocular Movements: Extraocular movements intact.      Conjunctiva/sclera: Conjunctivae normal.      Pupils: Pupils are equal, round, and reactive to light.   Neck:      Trachea: No tracheal deviation.   Cardiovascular:      Rate and Rhythm: Normal rate and regular rhythm.      Pulses: Normal pulses.           Dorsalis pedis pulses are 2+ on the right side and 2+ on the left side.        Posterior tibial pulses are 2+ on the right side and 2+ on the left side.      Heart sounds: Normal heart sounds. No murmur heard.     No friction rub. No gallop.   Pulmonary:      Effort: Pulmonary effort is normal. No respiratory distress.      Breath sounds: Normal breath sounds. No wheezing, rhonchi or rales.   Abdominal:      General: Bowel sounds are normal. There is no distension.      Palpations: Abdomen is soft.      Tenderness: There is no abdominal tenderness. There is no guarding or rebound.   Musculoskeletal:         General: Normal range of motion.      Cervical back: Normal range of motion and neck supple.      Right lower leg: No edema.      Left lower leg: No edema.   Lymphadenopathy:      Head:      Right side of head: No submental or submandibular adenopathy.      Left side of head: No submental or submandibular adenopathy.      Cervical: No cervical adenopathy.      Right cervical: No  superficial, deep or posterior cervical adenopathy.     Left cervical: No superficial, deep or posterior cervical adenopathy.   Skin:     General: Skin is warm and dry.      Findings: No erythema.   Neurological:      General: No focal deficit present.      Mental Status: He is alert and oriented to person, place, and time.      Cranial Nerves: No cranial nerve deficit.      Sensory: Sensation is intact. No sensory deficit.      Motor: Motor function is intact.   Psychiatric:         Attention and Perception: Attention and perception normal.         Mood and Affect: Mood is not anxious or depressed.         Speech: Speech normal.         Behavior: Behavior normal.         Thought Content: Thought content normal.         Judgment: Judgment normal.

## 2025-04-22 NOTE — ASSESSMENT & PLAN NOTE
Blood pressure appears clinically stable today.  Continue with his current treatment with losartan as well as amlodipine.  Orders:    Comprehensive metabolic panel; Future

## 2025-04-22 NOTE — ASSESSMENT & PLAN NOTE
Patient's lipid panel has been previously elevated.  He was prescribed atorvastatin however he did not take this medication.  He states that statin medications appeared to cause significant sedation/fatigue for patient.  Reviewed previous evaluation from cardiology.  It appears that he was placed on bempedoic acid however patient does not recall ever picking up this medication.  If this is covered by his insurance, he would highly benefit from restarting this medication.  Will check lipid panel first as he has been working on a strict diet and exercise.  If this medication is not covered and his cholesterol is still elevated, he will likely need to see cardiology again to further evaluate potential use of a PCSK9 inhibitor    Orders:    Lipid Panel with Direct LDL reflex; Future

## 2025-04-25 ENCOUNTER — RESULTS FOLLOW-UP (OUTPATIENT)
Dept: FAMILY MEDICINE CLINIC | Facility: CLINIC | Age: 60
End: 2025-04-25

## 2025-04-25 ENCOUNTER — APPOINTMENT (OUTPATIENT)
Dept: LAB | Facility: CLINIC | Age: 60
End: 2025-04-25
Payer: COMMERCIAL

## 2025-04-25 DIAGNOSIS — R73.03 PREDIABETES: ICD-10-CM

## 2025-04-25 DIAGNOSIS — I10 PRIMARY HYPERTENSION: ICD-10-CM

## 2025-04-25 DIAGNOSIS — R06.09 DOE (DYSPNEA ON EXERTION): ICD-10-CM

## 2025-04-25 DIAGNOSIS — E78.2 MIXED HYPERLIPIDEMIA: ICD-10-CM

## 2025-04-25 LAB
ALBUMIN SERPL BCG-MCNC: 4.5 G/DL (ref 3.5–5)
ALP SERPL-CCNC: 63 U/L (ref 34–104)
ALT SERPL W P-5'-P-CCNC: 19 U/L (ref 7–52)
ANION GAP SERPL CALCULATED.3IONS-SCNC: 7 MMOL/L (ref 4–13)
AST SERPL W P-5'-P-CCNC: 27 U/L (ref 13–39)
BILIRUB SERPL-MCNC: 0.53 MG/DL (ref 0.2–1)
BNP SERPL-MCNC: 8 PG/ML (ref 0–100)
BUN SERPL-MCNC: 15 MG/DL (ref 5–25)
CALCIUM SERPL-MCNC: 9.8 MG/DL (ref 8.4–10.2)
CHLORIDE SERPL-SCNC: 102 MMOL/L (ref 96–108)
CHOLEST SERPL-MCNC: 226 MG/DL (ref ?–200)
CO2 SERPL-SCNC: 28 MMOL/L (ref 21–32)
CREAT SERPL-MCNC: 0.9 MG/DL (ref 0.6–1.3)
EST. AVERAGE GLUCOSE BLD GHB EST-MCNC: 131 MG/DL
GFR SERPL CREATININE-BSD FRML MDRD: 93 ML/MIN/1.73SQ M
GLUCOSE P FAST SERPL-MCNC: 88 MG/DL (ref 65–99)
HBA1C MFR BLD: 6.2 %
HDLC SERPL-MCNC: 65 MG/DL
LDLC SERPL CALC-MCNC: 142 MG/DL (ref 0–100)
POTASSIUM SERPL-SCNC: 3.7 MMOL/L (ref 3.5–5.3)
PROT SERPL-MCNC: 7.5 G/DL (ref 6.4–8.4)
SODIUM SERPL-SCNC: 137 MMOL/L (ref 135–147)
TRIGL SERPL-MCNC: 93 MG/DL (ref ?–150)

## 2025-04-25 PROCEDURE — 83880 ASSAY OF NATRIURETIC PEPTIDE: CPT

## 2025-04-25 PROCEDURE — 80053 COMPREHEN METABOLIC PANEL: CPT

## 2025-04-25 PROCEDURE — 83036 HEMOGLOBIN GLYCOSYLATED A1C: CPT

## 2025-04-25 PROCEDURE — 80061 LIPID PANEL: CPT

## 2025-04-25 PROCEDURE — 36415 COLL VENOUS BLD VENIPUNCTURE: CPT

## 2025-05-15 DIAGNOSIS — M54.50 CHRONIC LEFT-SIDED LOW BACK PAIN WITHOUT SCIATICA: ICD-10-CM

## 2025-05-15 DIAGNOSIS — G89.29 CHRONIC LEFT-SIDED LOW BACK PAIN WITHOUT SCIATICA: ICD-10-CM

## 2025-05-15 RX ORDER — METAXALONE 400 MG/1
TABLET ORAL
Qty: 10 TABLET | Refills: 0 | Status: SHIPPED | OUTPATIENT
Start: 2025-05-15

## 2025-05-25 DIAGNOSIS — I10 PRIMARY HYPERTENSION: ICD-10-CM

## 2025-05-25 RX ORDER — LOSARTAN POTASSIUM 100 MG/1
100 TABLET ORAL DAILY
Qty: 90 TABLET | Refills: 0 | Status: SHIPPED | OUTPATIENT
Start: 2025-05-25

## 2025-06-20 ENCOUNTER — TELEPHONE (OUTPATIENT)
Dept: FAMILY MEDICINE CLINIC | Facility: CLINIC | Age: 60
End: 2025-06-20

## 2025-06-20 NOTE — TELEPHONE ENCOUNTER
Pt called upset with [facility] and would like to stop all appointments and get his records sent to him. Sending a records release form to [email or web address].    Please remove JIGNESH Perez as pcp.

## 2025-07-29 DIAGNOSIS — I10 PRIMARY HYPERTENSION: ICD-10-CM

## 2025-07-30 RX ORDER — AMLODIPINE BESYLATE 10 MG/1
10 TABLET ORAL DAILY
Qty: 90 TABLET | Refills: 1 | Status: SHIPPED | OUTPATIENT
Start: 2025-07-30